# Patient Record
Sex: MALE
[De-identification: names, ages, dates, MRNs, and addresses within clinical notes are randomized per-mention and may not be internally consistent; named-entity substitution may affect disease eponyms.]

---

## 2020-06-11 ENCOUNTER — HOSPITAL ENCOUNTER (EMERGENCY)
Dept: HOSPITAL 84 - D.ER | Age: 50
Discharge: HOME | End: 2020-06-11
Payer: SELF-PAY

## 2020-06-11 VITALS — HEIGHT: 71 IN | WEIGHT: 240.5 LBS | BODY MASS INDEX: 33.67 KG/M2

## 2020-06-11 VITALS — DIASTOLIC BLOOD PRESSURE: 84 MMHG | SYSTOLIC BLOOD PRESSURE: 152 MMHG

## 2020-06-11 DIAGNOSIS — I10: ICD-10-CM
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
## 2020-06-11 DIAGNOSIS — Z72.0: ICD-10-CM

## 2020-06-11 DIAGNOSIS — S32.039A: Primary | ICD-10-CM

## 2020-06-11 DIAGNOSIS — M51.36: ICD-10-CM

## 2020-06-11 DIAGNOSIS — M54.5: ICD-10-CM

## 2020-08-13 ENCOUNTER — HOSPITAL ENCOUNTER (EMERGENCY)
Dept: HOSPITAL 84 - D.ER | Age: 50
Discharge: HOME | End: 2020-08-13
Payer: COMMERCIAL

## 2020-08-13 VITALS — HEIGHT: 71 IN | WEIGHT: 230.48 LBS | BODY MASS INDEX: 32.27 KG/M2

## 2020-08-13 VITALS — SYSTOLIC BLOOD PRESSURE: 156 MMHG | DIASTOLIC BLOOD PRESSURE: 94 MMHG

## 2020-08-13 DIAGNOSIS — I10: ICD-10-CM

## 2020-08-13 DIAGNOSIS — G89.18: ICD-10-CM

## 2020-08-13 DIAGNOSIS — M25.561: ICD-10-CM

## 2020-08-13 DIAGNOSIS — L76.22: Primary | ICD-10-CM

## 2020-08-13 DIAGNOSIS — Z96.651: ICD-10-CM

## 2020-08-13 LAB
ALBUMIN SERPL-MCNC: 4.2 G/DL (ref 3.4–5)
ALP SERPL-CCNC: 89 U/L (ref 30–120)
ALT SERPL-CCNC: 35 U/L (ref 10–68)
ANION GAP SERPL CALC-SCNC: 14.7 MMOL/L (ref 8–16)
APTT BLD: 32 SECONDS (ref 22.8–39.4)
BASOPHILS NFR BLD AUTO: 0 % (ref 0–2)
BILIRUB SERPL-MCNC: 0.65 MG/DL (ref 0.2–1.3)
BUN SERPL-MCNC: 22 MG/DL (ref 7–18)
CALCIUM SERPL-MCNC: 8.6 MG/DL (ref 8.5–10.1)
CHLORIDE SERPL-SCNC: 104 MMOL/L (ref 98–107)
CO2 SERPL-SCNC: 25.1 MMOL/L (ref 21–32)
CREAT SERPL-MCNC: 1.3 MG/DL (ref 0.6–1.3)
EOSINOPHIL NFR BLD: 0 % (ref 0–7)
ERYTHROCYTE [DISTWIDTH] IN BLOOD BY AUTOMATED COUNT: 14.4 % (ref 11.5–14.5)
GLOBULIN SER-MCNC: 3.5 G/L
GLUCOSE SERPL-MCNC: 140 MG/DL (ref 74–106)
HCT VFR BLD CALC: 34.2 % (ref 42–54)
HGB BLD-MCNC: 11.4 G/DL (ref 13.5–17.5)
IMM GRANULOCYTES NFR BLD: 0.4 % (ref 0–5)
INR PPP: 1.19 (ref 0.85–1.17)
LYMPHOCYTES NFR BLD AUTO: 13.6 % (ref 15–50)
MCH RBC QN AUTO: 30.6 PG (ref 26–34)
MCHC RBC AUTO-ENTMCNC: 33.3 G/DL (ref 31–37)
MCV RBC: 91.7 FL (ref 80–100)
MONOCYTES NFR BLD: 8.3 % (ref 2–11)
NEUTROPHILS NFR BLD AUTO: 77.7 % (ref 40–80)
OSMOLALITY SERPL CALC.SUM OF ELEC: 283 MOSM/KG (ref 275–300)
PLATELET # BLD: 194 10X3/UL (ref 130–400)
PMV BLD AUTO: 8.3 FL (ref 7.4–10.4)
POTASSIUM SERPL-SCNC: 3.8 MMOL/L (ref 3.5–5.1)
PROT SERPL-MCNC: 7.7 G/DL (ref 6.4–8.2)
PROTHROMBIN TIME: 15 SECONDS (ref 11.6–15)
RBC # BLD AUTO: 3.73 10X6/UL (ref 4.2–6.1)
SODIUM SERPL-SCNC: 140 MMOL/L (ref 136–145)
WBC # BLD AUTO: 16.2 10X3/UL (ref 4.8–10.8)

## 2021-03-08 ENCOUNTER — HOSPITAL ENCOUNTER (INPATIENT)
Dept: HOSPITAL 84 - D.ER | Age: 51
LOS: 11 days | Discharge: HOME | DRG: 193 | End: 2021-03-19
Attending: FAMILY MEDICINE | Admitting: FAMILY MEDICINE
Payer: MEDICARE

## 2021-03-08 VITALS
BODY MASS INDEX: 32.97 KG/M2 | HEIGHT: 71 IN | WEIGHT: 235.49 LBS | HEIGHT: 71 IN | BODY MASS INDEX: 32.97 KG/M2 | BODY MASS INDEX: 32.97 KG/M2 | WEIGHT: 235.49 LBS

## 2021-03-08 VITALS — SYSTOLIC BLOOD PRESSURE: 151 MMHG | DIASTOLIC BLOOD PRESSURE: 107 MMHG

## 2021-03-08 VITALS — DIASTOLIC BLOOD PRESSURE: 98 MMHG | SYSTOLIC BLOOD PRESSURE: 145 MMHG

## 2021-03-08 VITALS — SYSTOLIC BLOOD PRESSURE: 149 MMHG | DIASTOLIC BLOOD PRESSURE: 98 MMHG

## 2021-03-08 DIAGNOSIS — G89.29: ICD-10-CM

## 2021-03-08 DIAGNOSIS — M51.36: ICD-10-CM

## 2021-03-08 DIAGNOSIS — F10.10: ICD-10-CM

## 2021-03-08 DIAGNOSIS — J18.9: Primary | ICD-10-CM

## 2021-03-08 DIAGNOSIS — J96.00: ICD-10-CM

## 2021-03-08 DIAGNOSIS — R04.2: ICD-10-CM

## 2021-03-08 DIAGNOSIS — E66.9: ICD-10-CM

## 2021-03-08 DIAGNOSIS — I10: ICD-10-CM

## 2021-03-08 DIAGNOSIS — J69.0: ICD-10-CM

## 2021-03-08 DIAGNOSIS — D64.9: ICD-10-CM

## 2021-03-08 DIAGNOSIS — F17.203: ICD-10-CM

## 2021-03-08 LAB
ALBUMIN SERPL-MCNC: 4.4 G/DL (ref 3.4–5)
ALP SERPL-CCNC: 143 U/L (ref 30–120)
ALT SERPL-CCNC: 30 U/L (ref 10–68)
AMPHETAMINES UR QL SCN: NEGATIVE QUAL
AMYLASE SERPL-CCNC: 19 U/L (ref 25–115)
ANION GAP SERPL CALC-SCNC: 14.3 MMOL/L (ref 8–16)
APTT BLD: 33.4 SECONDS (ref 22.8–39.4)
BARBITURATES UR QL SCN: NEGATIVE QUAL
BASOPHILS NFR BLD AUTO: 0.1 % (ref 0–2)
BENZODIAZ UR QL SCN: NEGATIVE QUAL
BILIRUB SERPL-MCNC: 0.61 MG/DL (ref 0.2–1.3)
BILIRUB SERPL-MCNC: NEGATIVE MG/DL
BUN SERPL-MCNC: 17 MG/DL (ref 7–18)
BZE UR QL SCN: NEGATIVE QUAL
CALCIUM SERPL-MCNC: 9.4 MG/DL (ref 8.5–10.1)
CANNABINOIDS UR QL SCN: NEGATIVE QUAL
CHLORIDE SERPL-SCNC: 101 MMOL/L (ref 98–107)
CK MB SERPL-MCNC: 3.3 U/L (ref 0–3.6)
CK SERPL-CCNC: 204 UL (ref 21–232)
CO2 SERPL-SCNC: 24.4 MMOL/L (ref 21–32)
CREAT SERPL-MCNC: 1 MG/DL (ref 0.6–1.3)
D DIMER PPP FEU-MCNC: 1.22 UG/MLFEU (ref 0.2–0.54)
EOSINOPHIL NFR BLD: 0.7 % (ref 0–7)
ERYTHROCYTE [DISTWIDTH] IN BLOOD BY AUTOMATED COUNT: 13.4 % (ref 11.5–14.5)
GLOBULIN SER-MCNC: 3.8 G/L
GLUCOSE SERPL-MCNC: 95 MG/DL (ref 74–106)
HCT VFR BLD CALC: 41.3 % (ref 42–54)
HGB BLD-MCNC: 14.2 G/DL (ref 13.5–17.5)
IMM GRANULOCYTES NFR BLD: 0.4 % (ref 0–5)
INR PPP: 1.15 (ref 0.85–1.17)
KETONES UR STRIP-MCNC: NEGATIVE MG/DL
LIPASE SERPL-CCNC: 48 U/L (ref 73–393)
LYMPHOCYTES # BLD: 1.95 10X3/UL (ref 1.32–3.57)
LYMPHOCYTES NFR BLD AUTO: 17.3 % (ref 15–50)
MCH RBC QN AUTO: 30.7 PG (ref 26–34)
MCHC RBC AUTO-ENTMCNC: 34.4 G/DL (ref 31–37)
MCV RBC: 89.4 FL (ref 80–100)
MONOCYTES NFR BLD: 5.7 % (ref 2–11)
NEUTROPHILS # BLD: 8.57 10X3/UL (ref 1.78–5.38)
NEUTROPHILS NFR BLD AUTO: 75.8 % (ref 40–80)
NITRITE UR-MCNC: NEGATIVE MG/ML
OPIATES UR QL SCN: NEGATIVE QUAL
OSMOLALITY SERPL CALC.SUM OF ELEC: 273 MOSM/KG (ref 275–300)
PCP UR QL SCN: NEGATIVE QUAL
PH UR STRIP: 5 [PH] (ref 5–8)
PLATELET # BLD: 148 10X3/UL (ref 130–400)
PMV BLD AUTO: 8 FL (ref 7.4–10.4)
POTASSIUM SERPL-SCNC: 3.7 MMOL/L (ref 3.5–5.1)
PROT SERPL-MCNC: 8.2 G/DL (ref 6.4–8.2)
PROTHROMBIN TIME: 13.6 SECONDS (ref 11.6–15)
RBC # BLD AUTO: 4.62 10X6/UL (ref 4.2–6.1)
SODIUM SERPL-SCNC: 136 MMOL/L (ref 136–145)
TROPONIN I SERPL-MCNC: < 0.017 NG/ML (ref 0–0.06)
UROBILINOGEN UR-MCNC: NORMAL MG/DL (ref ?–2)
WBC # BLD AUTO: 11.3 10X3/UL (ref 4.8–10.8)

## 2021-03-09 VITALS — DIASTOLIC BLOOD PRESSURE: 76 MMHG | SYSTOLIC BLOOD PRESSURE: 134 MMHG

## 2021-03-09 VITALS — SYSTOLIC BLOOD PRESSURE: 164 MMHG | DIASTOLIC BLOOD PRESSURE: 106 MMHG

## 2021-03-09 VITALS — SYSTOLIC BLOOD PRESSURE: 137 MMHG | DIASTOLIC BLOOD PRESSURE: 79 MMHG

## 2021-03-09 VITALS — DIASTOLIC BLOOD PRESSURE: 97 MMHG | SYSTOLIC BLOOD PRESSURE: 154 MMHG

## 2021-03-09 VITALS — DIASTOLIC BLOOD PRESSURE: 97 MMHG | SYSTOLIC BLOOD PRESSURE: 150 MMHG

## 2021-03-09 VITALS — DIASTOLIC BLOOD PRESSURE: 93 MMHG | SYSTOLIC BLOOD PRESSURE: 154 MMHG

## 2021-03-09 VITALS — SYSTOLIC BLOOD PRESSURE: 135 MMHG | DIASTOLIC BLOOD PRESSURE: 83 MMHG

## 2021-03-09 VITALS — SYSTOLIC BLOOD PRESSURE: 179 MMHG | DIASTOLIC BLOOD PRESSURE: 130 MMHG

## 2021-03-09 VITALS — SYSTOLIC BLOOD PRESSURE: 150 MMHG | DIASTOLIC BLOOD PRESSURE: 95 MMHG

## 2021-03-09 VITALS — SYSTOLIC BLOOD PRESSURE: 134 MMHG | DIASTOLIC BLOOD PRESSURE: 76 MMHG

## 2021-03-09 VITALS — SYSTOLIC BLOOD PRESSURE: 160 MMHG | DIASTOLIC BLOOD PRESSURE: 81 MMHG

## 2021-03-09 VITALS — SYSTOLIC BLOOD PRESSURE: 139 MMHG | DIASTOLIC BLOOD PRESSURE: 90 MMHG

## 2021-03-09 VITALS — DIASTOLIC BLOOD PRESSURE: 92 MMHG | SYSTOLIC BLOOD PRESSURE: 151 MMHG

## 2021-03-09 VITALS — DIASTOLIC BLOOD PRESSURE: 113 MMHG | SYSTOLIC BLOOD PRESSURE: 158 MMHG

## 2021-03-09 VITALS — SYSTOLIC BLOOD PRESSURE: 152 MMHG | DIASTOLIC BLOOD PRESSURE: 95 MMHG

## 2021-03-09 VITALS — DIASTOLIC BLOOD PRESSURE: 82 MMHG | SYSTOLIC BLOOD PRESSURE: 132 MMHG

## 2021-03-09 VITALS — DIASTOLIC BLOOD PRESSURE: 97 MMHG | SYSTOLIC BLOOD PRESSURE: 142 MMHG

## 2021-03-09 VITALS — SYSTOLIC BLOOD PRESSURE: 131 MMHG | DIASTOLIC BLOOD PRESSURE: 106 MMHG

## 2021-03-09 VITALS — SYSTOLIC BLOOD PRESSURE: 154 MMHG | DIASTOLIC BLOOD PRESSURE: 100 MMHG

## 2021-03-09 VITALS — DIASTOLIC BLOOD PRESSURE: 99 MMHG | SYSTOLIC BLOOD PRESSURE: 152 MMHG

## 2021-03-09 VITALS — SYSTOLIC BLOOD PRESSURE: 146 MMHG | DIASTOLIC BLOOD PRESSURE: 92 MMHG

## 2021-03-09 VITALS — DIASTOLIC BLOOD PRESSURE: 74 MMHG | SYSTOLIC BLOOD PRESSURE: 133 MMHG

## 2021-03-09 VITALS — SYSTOLIC BLOOD PRESSURE: 154 MMHG | DIASTOLIC BLOOD PRESSURE: 97 MMHG

## 2021-03-09 VITALS — SYSTOLIC BLOOD PRESSURE: 150 MMHG | DIASTOLIC BLOOD PRESSURE: 97 MMHG

## 2021-03-09 VITALS — DIASTOLIC BLOOD PRESSURE: 91 MMHG | SYSTOLIC BLOOD PRESSURE: 151 MMHG

## 2021-03-09 VITALS — DIASTOLIC BLOOD PRESSURE: 100 MMHG | SYSTOLIC BLOOD PRESSURE: 159 MMHG

## 2021-03-09 VITALS — DIASTOLIC BLOOD PRESSURE: 66 MMHG | SYSTOLIC BLOOD PRESSURE: 108 MMHG

## 2021-03-09 VITALS — SYSTOLIC BLOOD PRESSURE: 144 MMHG | DIASTOLIC BLOOD PRESSURE: 91 MMHG

## 2021-03-09 VITALS — SYSTOLIC BLOOD PRESSURE: 108 MMHG | DIASTOLIC BLOOD PRESSURE: 68 MMHG

## 2021-03-09 VITALS — SYSTOLIC BLOOD PRESSURE: 159 MMHG | DIASTOLIC BLOOD PRESSURE: 119 MMHG

## 2021-03-09 VITALS — DIASTOLIC BLOOD PRESSURE: 122 MMHG | SYSTOLIC BLOOD PRESSURE: 145 MMHG

## 2021-03-09 VITALS — DIASTOLIC BLOOD PRESSURE: 82 MMHG | SYSTOLIC BLOOD PRESSURE: 127 MMHG

## 2021-03-09 VITALS — SYSTOLIC BLOOD PRESSURE: 148 MMHG | DIASTOLIC BLOOD PRESSURE: 98 MMHG

## 2021-03-09 VITALS — SYSTOLIC BLOOD PRESSURE: 152 MMHG | DIASTOLIC BLOOD PRESSURE: 61 MMHG

## 2021-03-09 VITALS — DIASTOLIC BLOOD PRESSURE: 99 MMHG | SYSTOLIC BLOOD PRESSURE: 155 MMHG

## 2021-03-09 VITALS — DIASTOLIC BLOOD PRESSURE: 96 MMHG | SYSTOLIC BLOOD PRESSURE: 120 MMHG

## 2021-03-09 VITALS — SYSTOLIC BLOOD PRESSURE: 154 MMHG | DIASTOLIC BLOOD PRESSURE: 102 MMHG

## 2021-03-09 LAB
ALBUMIN SERPL-MCNC: 3.6 G/DL (ref 3.4–5)
ALP SERPL-CCNC: 119 U/L (ref 30–120)
ALT SERPL-CCNC: 25 U/L (ref 10–68)
ANION GAP SERPL CALC-SCNC: 11.5 MMOL/L (ref 8–16)
BASOPHILS NFR BLD AUTO: 0.2 % (ref 0–2)
BILIRUB SERPL-MCNC: 0.62 MG/DL (ref 0.2–1.3)
BUN SERPL-MCNC: 15 MG/DL (ref 7–18)
CALCIUM SERPL-MCNC: 8.1 MG/DL (ref 8.5–10.1)
CHLORIDE SERPL-SCNC: 104 MMOL/L (ref 98–107)
CO2 SERPL-SCNC: 25.2 MMOL/L (ref 21–32)
CREAT SERPL-MCNC: 1 MG/DL (ref 0.6–1.3)
EOSINOPHIL NFR BLD: 1.7 % (ref 0–7)
ERYTHROCYTE [DISTWIDTH] IN BLOOD BY AUTOMATED COUNT: 13.5 % (ref 11.5–14.5)
GLOBULIN SER-MCNC: 3.1 G/L
GLUCOSE SERPL-MCNC: 103 MG/DL (ref 74–106)
HCT VFR BLD CALC: 37.3 % (ref 42–54)
HCT VFR BLD CALC: 38.6 % (ref 42–54)
HCT VFR BLD CALC: 39.6 % (ref 42–54)
HCT VFR BLD CALC: 41.7 % (ref 42–54)
HGB BLD-MCNC: 12.6 G/DL (ref 13.5–17.5)
HGB BLD-MCNC: 13.3 G/DL (ref 13.5–17.5)
HGB BLD-MCNC: 13.9 G/DL (ref 13.5–17.5)
HGB BLD-MCNC: 14.6 G/DL (ref 13.5–17.5)
IMM GRANULOCYTES NFR BLD: 0.3 % (ref 0–5)
LYMPHOCYTES # BLD: 1.96 10X3/UL (ref 1.32–3.57)
LYMPHOCYTES NFR BLD AUTO: 22.6 % (ref 15–50)
MAGNESIUM SERPL-MCNC: 3.2 MG/DL (ref 1.8–2.4)
MCH RBC QN AUTO: 30.6 PG (ref 26–34)
MCHC RBC AUTO-ENTMCNC: 33.8 G/DL (ref 31–37)
MCV RBC: 90.5 FL (ref 80–100)
MONOCYTES NFR BLD: 6.6 % (ref 2–11)
NEUTROPHILS # BLD: 5.95 10X3/UL (ref 1.78–5.38)
NEUTROPHILS NFR BLD AUTO: 68.6 % (ref 40–80)
OSMOLALITY SERPL CALC.SUM OF ELEC: 274 MOSM/KG (ref 275–300)
PLATELET # BLD: 175 10X3/UL (ref 130–400)
PMV BLD AUTO: 8.3 FL (ref 7.4–10.4)
POTASSIUM SERPL-SCNC: 3.7 MMOL/L (ref 3.5–5.1)
PROT SERPL-MCNC: 6.7 G/DL (ref 6.4–8.2)
RBC # BLD AUTO: 4.12 10X6/UL (ref 4.2–6.1)
SODIUM SERPL-SCNC: 137 MMOL/L (ref 136–145)
WBC # BLD AUTO: 8.7 10X3/UL (ref 4.8–10.8)

## 2021-03-09 PROCEDURE — 0B9J8ZX DRAINAGE OF LEFT LOWER LUNG LOBE, VIA NATURAL OR ARTIFICIAL OPENING ENDOSCOPIC, DIAGNOSTIC: ICD-10-PCS | Performed by: INTERNAL MEDICINE

## 2021-03-09 NOTE — NUR
REPORTS HE TAKES METOPROLOL 25 MG AND ALSO THAT HE USUALLY TAKES HIS BP MEDS
AT NIGHT. CALLED DR. KAUFFMAN FOR METOPROLOL ORDER AND CALLED [facility] TO CHANGE
TO NIGHTS.

## 2021-03-09 NOTE — NUR
DR. KAUFFMAN CALLED. PT REQUESTING HIS MEDICINES AND MED REC NOT DONE. DR. KAUFFMAN REPORTS HE WILL BE AROUND SOON AND WILL WORK ON IT.

## 2021-03-09 NOTE — NUR
CONSULTED DR. GR. REPORTED S/P BRONCH WITH COLLECTION OF BLOOD. ORDERS
RECEIVED FOR DIET, AS HE DOES NOT PLAN ON DOING A SCOPE.

## 2021-03-09 NOTE — NUR
BRONCH COMPLETE. BLOOD COLLECTED FROM LLL PER DR. AMBRIZ. 4 MG VERSED AND 50
MCG OF FENTANYL GIVEN. DID NOT REMAIN COMFORTABLE DURING PROCEDURE. PAGED DR. KAUFFMAN ON PT'S BEHALF AND ASKED IF PATCH COULD BE REPLACED FOR PERCOCET AND
HE OKAYS THIS.

## 2021-03-10 VITALS — DIASTOLIC BLOOD PRESSURE: 89 MMHG | SYSTOLIC BLOOD PRESSURE: 107 MMHG

## 2021-03-10 VITALS — DIASTOLIC BLOOD PRESSURE: 87 MMHG | SYSTOLIC BLOOD PRESSURE: 161 MMHG

## 2021-03-10 VITALS — DIASTOLIC BLOOD PRESSURE: 94 MMHG | SYSTOLIC BLOOD PRESSURE: 160 MMHG

## 2021-03-10 VITALS — DIASTOLIC BLOOD PRESSURE: 96 MMHG | SYSTOLIC BLOOD PRESSURE: 155 MMHG

## 2021-03-10 VITALS — SYSTOLIC BLOOD PRESSURE: 141 MMHG | DIASTOLIC BLOOD PRESSURE: 83 MMHG

## 2021-03-10 VITALS — SYSTOLIC BLOOD PRESSURE: 164 MMHG | DIASTOLIC BLOOD PRESSURE: 102 MMHG

## 2021-03-10 VITALS — DIASTOLIC BLOOD PRESSURE: 86 MMHG | SYSTOLIC BLOOD PRESSURE: 127 MMHG

## 2021-03-10 VITALS — DIASTOLIC BLOOD PRESSURE: 96 MMHG | SYSTOLIC BLOOD PRESSURE: 121 MMHG

## 2021-03-10 VITALS — SYSTOLIC BLOOD PRESSURE: 140 MMHG | DIASTOLIC BLOOD PRESSURE: 82 MMHG

## 2021-03-10 VITALS — DIASTOLIC BLOOD PRESSURE: 81 MMHG | SYSTOLIC BLOOD PRESSURE: 136 MMHG

## 2021-03-10 VITALS — DIASTOLIC BLOOD PRESSURE: 89 MMHG | SYSTOLIC BLOOD PRESSURE: 126 MMHG

## 2021-03-10 VITALS — DIASTOLIC BLOOD PRESSURE: 90 MMHG | SYSTOLIC BLOOD PRESSURE: 141 MMHG

## 2021-03-10 VITALS — SYSTOLIC BLOOD PRESSURE: 121 MMHG | DIASTOLIC BLOOD PRESSURE: 75 MMHG

## 2021-03-10 VITALS — DIASTOLIC BLOOD PRESSURE: 92 MMHG | SYSTOLIC BLOOD PRESSURE: 141 MMHG

## 2021-03-10 VITALS — DIASTOLIC BLOOD PRESSURE: 94 MMHG | SYSTOLIC BLOOD PRESSURE: 151 MMHG

## 2021-03-10 VITALS — DIASTOLIC BLOOD PRESSURE: 91 MMHG | SYSTOLIC BLOOD PRESSURE: 142 MMHG

## 2021-03-10 VITALS — DIASTOLIC BLOOD PRESSURE: 93 MMHG | SYSTOLIC BLOOD PRESSURE: 152 MMHG

## 2021-03-10 LAB
ALBUMIN SERPL-MCNC: 3.7 G/DL (ref 3.4–5)
ALP SERPL-CCNC: 131 U/L (ref 30–120)
ALT SERPL-CCNC: 26 U/L (ref 10–68)
ANION GAP SERPL CALC-SCNC: 14.4 MMOL/L (ref 8–16)
BASOPHILS NFR BLD AUTO: 0 % (ref 0–2)
BILIRUB SERPL-MCNC: 0.36 MG/DL (ref 0.2–1.3)
BUN SERPL-MCNC: 18 MG/DL (ref 7–18)
CALCIUM SERPL-MCNC: 8.7 MG/DL (ref 8.5–10.1)
CHLORIDE SERPL-SCNC: 102 MMOL/L (ref 98–107)
CO2 SERPL-SCNC: 21.2 MMOL/L (ref 21–32)
CREAT SERPL-MCNC: 1.1 MG/DL (ref 0.6–1.3)
EOSINOPHIL NFR BLD: 0.1 % (ref 0–7)
ERYTHROCYTE [DISTWIDTH] IN BLOOD BY AUTOMATED COUNT: 13.5 % (ref 11.5–14.5)
GLOBULIN SER-MCNC: 3.6 G/L
GLUCOSE SERPL-MCNC: 217 MG/DL (ref 74–106)
HCT VFR BLD CALC: 38 % (ref 42–54)
HCT VFR BLD CALC: 39.2 % (ref 42–54)
HCT VFR BLD CALC: 39.3 % (ref 42–54)
HCT VFR BLD CALC: 40 % (ref 42–54)
HGB BLD-MCNC: 13.1 G/DL (ref 13.5–17.5)
HGB BLD-MCNC: 13.2 G/DL (ref 13.5–17.5)
HGB BLD-MCNC: 13.5 G/DL (ref 13.5–17.5)
HGB BLD-MCNC: 13.8 G/DL (ref 13.5–17.5)
IMM GRANULOCYTES NFR BLD: 0.4 % (ref 0–5)
LYMPHOCYTES # BLD: 0.85 10X3/UL (ref 1.32–3.57)
LYMPHOCYTES NFR BLD AUTO: 7.3 % (ref 15–50)
MAGNESIUM SERPL-MCNC: 2.6 MG/DL (ref 1.8–2.4)
MCH RBC QN AUTO: 30.8 PG (ref 26–34)
MCHC RBC AUTO-ENTMCNC: 33.7 G/DL (ref 31–37)
MCV RBC: 91.4 FL (ref 80–100)
MONOCYTES NFR BLD: 0.8 % (ref 2–11)
NEUTROPHILS # BLD: 10.57 10X3/UL (ref 1.78–5.38)
NEUTROPHILS NFR BLD AUTO: 91.4 % (ref 40–80)
OSMOLALITY SERPL CALC.SUM OF ELEC: 274 MOSM/KG (ref 275–300)
PLATELET # BLD: 203 10X3/UL (ref 130–400)
PMV BLD AUTO: 8.7 FL (ref 7.4–10.4)
POTASSIUM SERPL-SCNC: 4.6 MMOL/L (ref 3.5–5.1)
PROT SERPL-MCNC: 7.3 G/DL (ref 6.4–8.2)
RBC # BLD AUTO: 4.29 10X6/UL (ref 4.2–6.1)
SODIUM SERPL-SCNC: 133 MMOL/L (ref 136–145)
SPECIMEN PREPARATION: (no result)
WBC # BLD AUTO: 11.6 10X3/UL (ref 4.8–10.8)

## 2021-03-11 VITALS — SYSTOLIC BLOOD PRESSURE: 142 MMHG | DIASTOLIC BLOOD PRESSURE: 95 MMHG

## 2021-03-11 VITALS — SYSTOLIC BLOOD PRESSURE: 128 MMHG | DIASTOLIC BLOOD PRESSURE: 83 MMHG

## 2021-03-11 VITALS — DIASTOLIC BLOOD PRESSURE: 64 MMHG | SYSTOLIC BLOOD PRESSURE: 137 MMHG

## 2021-03-11 VITALS — SYSTOLIC BLOOD PRESSURE: 133 MMHG | DIASTOLIC BLOOD PRESSURE: 65 MMHG

## 2021-03-11 VITALS — DIASTOLIC BLOOD PRESSURE: 65 MMHG | SYSTOLIC BLOOD PRESSURE: 107 MMHG

## 2021-03-11 VITALS — DIASTOLIC BLOOD PRESSURE: 64 MMHG | SYSTOLIC BLOOD PRESSURE: 116 MMHG

## 2021-03-11 VITALS — SYSTOLIC BLOOD PRESSURE: 137 MMHG | DIASTOLIC BLOOD PRESSURE: 92 MMHG

## 2021-03-11 VITALS — SYSTOLIC BLOOD PRESSURE: 172 MMHG | DIASTOLIC BLOOD PRESSURE: 97 MMHG

## 2021-03-11 VITALS — SYSTOLIC BLOOD PRESSURE: 137 MMHG | DIASTOLIC BLOOD PRESSURE: 86 MMHG

## 2021-03-11 VITALS — DIASTOLIC BLOOD PRESSURE: 55 MMHG | SYSTOLIC BLOOD PRESSURE: 98 MMHG

## 2021-03-11 VITALS — DIASTOLIC BLOOD PRESSURE: 86 MMHG | SYSTOLIC BLOOD PRESSURE: 137 MMHG

## 2021-03-11 VITALS — SYSTOLIC BLOOD PRESSURE: 166 MMHG | DIASTOLIC BLOOD PRESSURE: 89 MMHG

## 2021-03-11 VITALS — SYSTOLIC BLOOD PRESSURE: 133 MMHG | DIASTOLIC BLOOD PRESSURE: 77 MMHG

## 2021-03-11 LAB
ALBUMIN SERPL-MCNC: 3.8 G/DL (ref 3.4–5)
ALP SERPL-CCNC: 134 U/L (ref 30–120)
ALT SERPL-CCNC: 31 U/L (ref 10–68)
ANION GAP SERPL CALC-SCNC: 12.1 MMOL/L (ref 8–16)
BILIRUB SERPL-MCNC: 0.38 MG/DL (ref 0.2–1.3)
BUN SERPL-MCNC: 16 MG/DL (ref 7–18)
CALCIUM SERPL-MCNC: 8.8 MG/DL (ref 8.5–10.1)
CHLORIDE SERPL-SCNC: 105 MMOL/L (ref 98–107)
CO2 SERPL-SCNC: 24.1 MMOL/L (ref 21–32)
CREAT SERPL-MCNC: 1.2 MG/DL (ref 0.6–1.3)
ERYTHROCYTE [DISTWIDTH] IN BLOOD BY AUTOMATED COUNT: 13.4 % (ref 11.5–14.5)
GLOBULIN SER-MCNC: 3.6 G/L
GLUCOSE SERPL-MCNC: 211 MG/DL (ref 74–106)
HCT VFR BLD CALC: 37.9 % (ref 42–54)
HCT VFR BLD CALC: 39.2 % (ref 42–54)
HCT VFR BLD CALC: 39.2 % (ref 42–54)
HCT VFR BLD CALC: 39.9 % (ref 42–54)
HGB BLD-MCNC: 12.9 G/DL (ref 13.5–17.5)
HGB BLD-MCNC: 13.2 G/DL (ref 13.5–17.5)
HGB BLD-MCNC: 13.3 G/DL (ref 13.5–17.5)
HGB BLD-MCNC: 13.6 G/DL (ref 13.5–17.5)
LYMPHOCYTES # BLD: 1.14 10X3/UL (ref 1.32–3.57)
LYMPHOCYTES NFR BLD AUTO: 6 % (ref 15–50)
MAGNESIUM SERPL-MCNC: 2.4 MG/DL (ref 1.8–2.4)
MCH RBC QN AUTO: 30.9 PG (ref 26–34)
MCHC RBC AUTO-ENTMCNC: 34.1 G/DL (ref 31–37)
MCV RBC: 90.7 FL (ref 80–100)
NEUTROPHILS # BLD: 19.45 10X3/UL (ref 1.78–5.38)
NEUTROPHILS NFR BLD AUTO: 93 % (ref 40–80)
OSMOLALITY SERPL CALC.SUM OF ELEC: 280 MOSM/KG (ref 275–300)
PLATELET # BLD EST: (no result) 10*3/UL
PLATELET # BLD: 195 10X3/UL (ref 130–400)
PMV BLD AUTO: 8.5 FL (ref 7.4–10.4)
POTASSIUM SERPL-SCNC: 4.2 MMOL/L (ref 3.5–5.1)
PROT SERPL-MCNC: 7.4 G/DL (ref 6.4–8.2)
RBC # BLD AUTO: 4.4 10X6/UL (ref 4.2–6.1)
SODIUM SERPL-SCNC: 137 MMOL/L (ref 136–145)
WBC # BLD AUTO: 21.3 10X3/UL (ref 4.8–10.8)

## 2021-03-12 VITALS — SYSTOLIC BLOOD PRESSURE: 170 MMHG | DIASTOLIC BLOOD PRESSURE: 90 MMHG

## 2021-03-12 VITALS — DIASTOLIC BLOOD PRESSURE: 98 MMHG | SYSTOLIC BLOOD PRESSURE: 157 MMHG

## 2021-03-12 VITALS — DIASTOLIC BLOOD PRESSURE: 85 MMHG | SYSTOLIC BLOOD PRESSURE: 157 MMHG

## 2021-03-12 VITALS — SYSTOLIC BLOOD PRESSURE: 145 MMHG | DIASTOLIC BLOOD PRESSURE: 87 MMHG

## 2021-03-12 VITALS — SYSTOLIC BLOOD PRESSURE: 157 MMHG | DIASTOLIC BLOOD PRESSURE: 92 MMHG

## 2021-03-12 VITALS — DIASTOLIC BLOOD PRESSURE: 57 MMHG | SYSTOLIC BLOOD PRESSURE: 145 MMHG

## 2021-03-12 LAB
ALBUMIN SERPL-MCNC: 3.5 G/DL (ref 3.4–5)
ALP SERPL-CCNC: 118 U/L (ref 30–120)
ALT SERPL-CCNC: 35 U/L (ref 10–68)
ANION GAP SERPL CALC-SCNC: 15.7 MMOL/L (ref 8–16)
BASOPHILS NFR BLD AUTO: 0 % (ref 0–2)
BILIRUB SERPL-MCNC: 0.31 MG/DL (ref 0.2–1.3)
BILIRUB SERPL-MCNC: NEGATIVE MG/DL
BUN SERPL-MCNC: 21 MG/DL (ref 7–18)
CALCIUM SERPL-MCNC: 8.6 MG/DL (ref 8.5–10.1)
CHLORIDE SERPL-SCNC: 102 MMOL/L (ref 98–107)
CO2 SERPL-SCNC: 23.2 MMOL/L (ref 21–32)
CREAT SERPL-MCNC: 1.1 MG/DL (ref 0.6–1.3)
EOSINOPHIL NFR BLD: 0 % (ref 0–7)
ERYTHROCYTE [DISTWIDTH] IN BLOOD BY AUTOMATED COUNT: 13.5 % (ref 11.5–14.5)
GLOBULIN SER-MCNC: 3.5 G/L
GLUCOSE SERPL-MCNC: 191 MG/DL (ref 74–106)
HCT VFR BLD CALC: 37.8 % (ref 42–54)
HGB BLD-MCNC: 12.7 G/DL (ref 13.5–17.5)
IMM GRANULOCYTES NFR BLD: 0.8 % (ref 0–5)
KETONES UR STRIP-MCNC: NEGATIVE MG/DL
LYMPHOCYTES # BLD: 1.36 10X3/UL (ref 1.32–3.57)
LYMPHOCYTES NFR BLD AUTO: 6.7 % (ref 15–50)
MAGNESIUM SERPL-MCNC: 2.3 MG/DL (ref 1.8–2.4)
MCH RBC QN AUTO: 30.7 PG (ref 26–34)
MCHC RBC AUTO-ENTMCNC: 33.6 G/DL (ref 31–37)
MCV RBC: 91.3 FL (ref 80–100)
MONOCYTES NFR BLD: 3.6 % (ref 2–11)
NEUTROPHILS # BLD: 18.06 10X3/UL (ref 1.78–5.38)
NEUTROPHILS NFR BLD AUTO: 88.9 % (ref 40–80)
NITRITE UR-MCNC: NEGATIVE MG/ML
OSMOLALITY SERPL CALC.SUM OF ELEC: 281 MOSM/KG (ref 275–300)
PH UR STRIP: 5 [PH] (ref 5–8)
PLATELET # BLD: 200 10X3/UL (ref 130–400)
PMV BLD AUTO: 8.7 FL (ref 7.4–10.4)
POTASSIUM SERPL-SCNC: 3.9 MMOL/L (ref 3.5–5.1)
PROT SERPL-MCNC: 7 G/DL (ref 6.4–8.2)
RBC # BLD AUTO: 4.14 10X6/UL (ref 4.2–6.1)
SODIUM SERPL-SCNC: 137 MMOL/L (ref 136–145)
UROBILINOGEN UR-MCNC: NORMAL MG/DL (ref ?–2)
WBC # BLD AUTO: 20.3 10X3/UL (ref 4.8–10.8)

## 2021-03-12 NOTE — NUR
PT WAS SITTING IN CHAIR, WIFE IN ROOM. RECEIVED REPORT ABOUT [employer]
PATCHES AND THAT THE PATIENT WOULD LIKE TO HAVE ONE APPLIED. CALLED AND SPOKE
TO MARIZA FLORES ABOUT PATCHES AND SITUATION. RECEIVED TELEPHONE ORDER FROM
MARIZA FLORES THAT IT WS OKAY TO USE PATIENT SUPPLY OF THE TRANSDERMAL PATCHES AT
THIS TIME.

## 2021-03-13 VITALS — DIASTOLIC BLOOD PRESSURE: 98 MMHG | SYSTOLIC BLOOD PRESSURE: 159 MMHG

## 2021-03-13 VITALS — DIASTOLIC BLOOD PRESSURE: 88 MMHG | SYSTOLIC BLOOD PRESSURE: 132 MMHG

## 2021-03-13 VITALS — DIASTOLIC BLOOD PRESSURE: 76 MMHG | SYSTOLIC BLOOD PRESSURE: 125 MMHG

## 2021-03-13 VITALS — DIASTOLIC BLOOD PRESSURE: 78 MMHG | SYSTOLIC BLOOD PRESSURE: 122 MMHG

## 2021-03-13 VITALS — DIASTOLIC BLOOD PRESSURE: 93 MMHG | SYSTOLIC BLOOD PRESSURE: 146 MMHG

## 2021-03-13 VITALS — DIASTOLIC BLOOD PRESSURE: 87 MMHG | SYSTOLIC BLOOD PRESSURE: 154 MMHG

## 2021-03-13 VITALS — DIASTOLIC BLOOD PRESSURE: 79 MMHG | SYSTOLIC BLOOD PRESSURE: 118 MMHG

## 2021-03-13 VITALS — SYSTOLIC BLOOD PRESSURE: 146 MMHG | DIASTOLIC BLOOD PRESSURE: 93 MMHG

## 2021-03-13 VITALS — DIASTOLIC BLOOD PRESSURE: 85 MMHG | SYSTOLIC BLOOD PRESSURE: 140 MMHG

## 2021-03-13 VITALS — SYSTOLIC BLOOD PRESSURE: 135 MMHG | DIASTOLIC BLOOD PRESSURE: 91 MMHG

## 2021-03-13 VITALS — SYSTOLIC BLOOD PRESSURE: 136 MMHG | DIASTOLIC BLOOD PRESSURE: 88 MMHG

## 2021-03-13 VITALS — SYSTOLIC BLOOD PRESSURE: 174 MMHG | DIASTOLIC BLOOD PRESSURE: 107 MMHG

## 2021-03-13 VITALS — SYSTOLIC BLOOD PRESSURE: 138 MMHG | DIASTOLIC BLOOD PRESSURE: 85 MMHG

## 2021-03-13 VITALS — DIASTOLIC BLOOD PRESSURE: 78 MMHG | SYSTOLIC BLOOD PRESSURE: 123 MMHG

## 2021-03-13 VITALS — SYSTOLIC BLOOD PRESSURE: 115 MMHG | DIASTOLIC BLOOD PRESSURE: 81 MMHG

## 2021-03-13 VITALS — SYSTOLIC BLOOD PRESSURE: 138 MMHG | DIASTOLIC BLOOD PRESSURE: 83 MMHG

## 2021-03-13 LAB
ALBUMIN SERPL-MCNC: 3.4 G/DL (ref 3.4–5)
ALP SERPL-CCNC: 136 U/L (ref 30–120)
ALT SERPL-CCNC: 57 U/L (ref 10–68)
ANION GAP SERPL CALC-SCNC: 12.6 MMOL/L (ref 8–16)
ANION GAP SERPL CALC-SCNC: 12.9 MMOL/L (ref 8–16)
BASOPHILS NFR BLD AUTO: 0.1 % (ref 0–2)
BASOPHILS NFR BLD AUTO: 0.1 % (ref 0–2)
BILIRUB SERPL-MCNC: 0.33 MG/DL (ref 0.2–1.3)
BUN SERPL-MCNC: 21 MG/DL (ref 7–18)
BUN SERPL-MCNC: 24 MG/DL (ref 7–18)
CALCIUM SERPL-MCNC: 8.3 MG/DL (ref 8.5–10.1)
CALCIUM SERPL-MCNC: 8.4 MG/DL (ref 8.5–10.1)
CHLORIDE SERPL-SCNC: 101 MMOL/L (ref 98–107)
CHLORIDE SERPL-SCNC: 102 MMOL/L (ref 98–107)
CO2 SERPL-SCNC: 26.3 MMOL/L (ref 21–32)
CO2 SERPL-SCNC: 26.8 MMOL/L (ref 21–32)
CREAT SERPL-MCNC: 1.2 MG/DL (ref 0.6–1.3)
CREAT SERPL-MCNC: 1.3 MG/DL (ref 0.6–1.3)
EOSINOPHIL NFR BLD: 0 % (ref 0–7)
EOSINOPHIL NFR BLD: 0.1 % (ref 0–7)
ERYTHROCYTE [DISTWIDTH] IN BLOOD BY AUTOMATED COUNT: 13.3 % (ref 11.5–14.5)
ERYTHROCYTE [DISTWIDTH] IN BLOOD BY AUTOMATED COUNT: 13.4 % (ref 11.5–14.5)
GLOBULIN SER-MCNC: 3.1 G/L
GLUCOSE SERPL-MCNC: 273 MG/DL (ref 74–106)
GLUCOSE SERPL-MCNC: 293 MG/DL (ref 74–106)
HCT VFR BLD CALC: 36.7 % (ref 42–54)
HCT VFR BLD CALC: 37.2 % (ref 42–54)
HGB BLD-MCNC: 12.6 G/DL (ref 13.5–17.5)
HGB BLD-MCNC: 12.7 G/DL (ref 13.5–17.5)
IMM GRANULOCYTES NFR BLD: 3.7 % (ref 0–5)
IMM GRANULOCYTES NFR BLD: 5.8 % (ref 0–5)
INR PPP: 1.24 (ref 0.85–1.17)
LYMPHOCYTES # BLD: 0.99 10X3/UL (ref 1.32–3.57)
LYMPHOCYTES # BLD: 1.25 10X3/UL (ref 1.32–3.57)
LYMPHOCYTES NFR BLD AUTO: 5.6 % (ref 15–50)
LYMPHOCYTES NFR BLD AUTO: 7.3 % (ref 15–50)
MAGNESIUM SERPL-MCNC: 2.2 MG/DL (ref 1.8–2.4)
MCH RBC QN AUTO: 30.7 PG (ref 26–34)
MCH RBC QN AUTO: 31.6 PG (ref 26–34)
MCHC RBC AUTO-ENTMCNC: 33.9 G/DL (ref 31–37)
MCHC RBC AUTO-ENTMCNC: 34.6 G/DL (ref 31–37)
MCV RBC: 90.7 FL (ref 80–100)
MCV RBC: 91.3 FL (ref 80–100)
MONOCYTES NFR BLD: 5.2 % (ref 2–11)
MONOCYTES NFR BLD: 5.4 % (ref 2–11)
NEUTROPHILS # BLD: 14.42 10X3/UL (ref 1.78–5.38)
NEUTROPHILS # BLD: 14.62 10X3/UL (ref 1.78–5.38)
NEUTROPHILS NFR BLD AUTO: 83 % (ref 40–80)
NEUTROPHILS NFR BLD AUTO: 83.7 % (ref 40–80)
OSMOLALITY SERPL CALC.SUM OF ELEC: 286 MOSM/KG (ref 275–300)
OSMOLALITY SERPL CALC.SUM OF ELEC: 286 MOSM/KG (ref 275–300)
PLATELET # BLD: 192 10X3/UL (ref 130–400)
PLATELET # BLD: 194 10X3/UL (ref 130–400)
PMV BLD AUTO: 8.8 FL (ref 7.4–10.4)
PMV BLD AUTO: 9 FL (ref 7.4–10.4)
POTASSIUM SERPL-SCNC: 4.2 MMOL/L (ref 3.5–5.1)
POTASSIUM SERPL-SCNC: 4.4 MMOL/L (ref 3.5–5.1)
PROT SERPL-MCNC: 6.5 G/DL (ref 6.4–8.2)
PROTHROMBIN TIME: 14.4 SECONDS (ref 11.6–15)
RBC # BLD AUTO: 4.02 10X6/UL (ref 4.2–6.1)
RBC # BLD AUTO: 4.1 10X6/UL (ref 4.2–6.1)
SODIUM SERPL-SCNC: 136 MMOL/L (ref 136–145)
SODIUM SERPL-SCNC: 137 MMOL/L (ref 136–145)
WBC # BLD AUTO: 17.2 10X3/UL (ref 4.8–10.8)
WBC # BLD AUTO: 17.6 10X3/UL (ref 4.8–10.8)

## 2021-03-13 PROCEDURE — 05HN33Z INSERTION OF INFUSION DEVICE INTO LEFT INTERNAL JUGULAR VEIN, PERCUTANEOUS APPROACH: ICD-10-PCS | Performed by: INTERNAL MEDICINE

## 2021-03-13 PROCEDURE — 0B9J8ZX DRAINAGE OF LEFT LOWER LUNG LOBE, VIA NATURAL OR ARTIFICIAL OPENING ENDOSCOPIC, DIAGNOSTIC: ICD-10-PCS | Performed by: INTERNAL MEDICINE

## 2021-03-13 PROCEDURE — 0DJ08ZZ INSPECTION OF UPPER INTESTINAL TRACT, VIA NATURAL OR ARTIFICIAL OPENING ENDOSCOPIC: ICD-10-PCS | Performed by: INTERNAL MEDICINE

## 2021-03-13 NOTE — NUR
PT WAS SITTING ON THE SIDE OF BED. NO DISTRESS NOTED. NO COMPLAINTS OF PAIN AT
THAT TIME. RESPIRATIONS WNL. ALERT AND ORIENTED. WIFE AT BEDSIDE. CALL LIGHT
IN REACH

## 2021-03-13 NOTE — NUR
PT HAS BEEN INTUBATED BY ANESTHESIA, FOLLOWING THAT BRONCHOSCOPY WAS
PERFORMED BY DR AMBRIZ WHO STATED THE BLEED IS NOT FROM RESPIRATORY, DR AMBRIZ
THEN SPOKEN ON THE PHONE TO PTS DAUGHTER MARCOS SRINIVASANZAC CALLED AND UPDATED
BY DR AMBRIZ AND THIS NURSE; DR GR STATED HE WOULD BE BY IN A LITTLE BIT TO
SEE PT. ALSO ORDERS FOR CVL CONSENT RECIEVED BY DR AMBRIZ TO PERFORM, CONSENTS
OBTAINED BY PTS DAUGHTER. WILL CONTINUE TO CLOSELY OBSERVE.

## 2021-03-13 NOTE — NUR
AAOX4 UPON ENTERING. ADMINISTERED MORNING MEDICATION AND PRN ATIVAN. TOELRATED
WELL. BREAKFAST AT BEDSIDE. RESTING COMFORTABLY. DENIES ANY NEEDS. BED IN
LOWEST POSITION, BED RAILS X2, CALL LIGHT WITHIN REACH. WILL CONTINUE POC.

## 2021-03-13 NOTE — NUR
PT ARRIVED TO UNIT AT THIS TIME PER DR AMBRIZ ORDERS FOR INTUBATION. HE IS
ALERT AND ORIENTED, NERVOUS, BUT STATES HE UNDERSTANDS WHY HE NEEDS TO BE
INTUBATED. HE HAS BRIGHT RED BLOOD ON HIS BEARD FROM SPITTING OUT BLOOD, HE
REQUESTS A SPIT CUP BECAUSE HE SAYS HE FREQUENTLY SPITS BLOOD UP SINCE MONDAY.
CONNECTED TO MONITORING EQUIPMENT. WILL NOTIFY DR AMBRIZ PT IS HERE FOR FURTHER
ORDERS. PT CALLED WIFE AND NOTIFIED HER WHEN HE ARRIVED TO UNIT. SHE REQUESTS
TO SPEAK WITH DR AMBRIZ, WILL NOTIFY HIM OF THIS TOO.

## 2021-03-13 NOTE — NUR
PT SPIT UP MORE FRESH BLOOD. CALLED AMBRIZ, ORDERED TO PUT ON NPO STATUS AND
TRANSFERRED TO ICU FOR INTUBATION AND BRONCHOSCOPY. WHEELED OVER VIA
WHEELCHAIR. BEDSIDE REPORT GIVEN TO ICU NURSE.

## 2021-03-13 NOTE — NUR
1455 PT 2303, ON VENTALOR WITH DIPRAVAN SEDATION.
1510 124/82 91 21 98% SAT
1515 116/84 94 20 98% SAT
1517 PROCEDURE COMPLETE, DX EGD, NOTHING TO TREAT

## 2021-03-13 NOTE — NUR
PER DR AMBRIZ, OBTAIN CONSENT FOR BRONCHOSCOPY AND CALL ANESTHESIA FOR
INTUBATION, WILL DO BRONCH AFTER INTUBATION.
SPOKE WITH DR NEIL TO PROVIDE UPDATES ON PT. DR GR STATES HE DOES NOT
THINK THE BLEEDING IS GI RELATED, HE THINKS IT IS RESPIRATORY-RELATED, BUT IF
IT IS DETERMINED THAT THE BLEEDING IS GI RELATED TO THEN LET HIM KNOW. HE
STATES THAT IF IT WERE ESOPHAGEAL VARIGIES PT WOULD BE VOMITING UP BLOOD.
ANESTHESIA PAGED AND NOTIFIED FOR PT TO BE INTUBATED.

## 2021-03-13 NOTE — NUR
BEDSIDE CVL PLACED BY DR AMBRIZ, AFTER XRAY HE STATED IS OKAY FOR USE.
ALSO  VOISE TO DO EGD TODAY, CONSENTS OBTAINED FROM PTS DAUGHTER. VSS. WILL
CONTINUE PLAN OF CARE.

## 2021-03-14 VITALS — DIASTOLIC BLOOD PRESSURE: 75 MMHG | SYSTOLIC BLOOD PRESSURE: 119 MMHG

## 2021-03-14 VITALS — SYSTOLIC BLOOD PRESSURE: 115 MMHG | DIASTOLIC BLOOD PRESSURE: 74 MMHG

## 2021-03-14 VITALS — SYSTOLIC BLOOD PRESSURE: 112 MMHG | DIASTOLIC BLOOD PRESSURE: 71 MMHG

## 2021-03-14 VITALS — DIASTOLIC BLOOD PRESSURE: 73 MMHG | SYSTOLIC BLOOD PRESSURE: 112 MMHG

## 2021-03-14 VITALS — SYSTOLIC BLOOD PRESSURE: 117 MMHG | DIASTOLIC BLOOD PRESSURE: 74 MMHG

## 2021-03-14 VITALS — SYSTOLIC BLOOD PRESSURE: 121 MMHG | DIASTOLIC BLOOD PRESSURE: 70 MMHG

## 2021-03-14 VITALS — SYSTOLIC BLOOD PRESSURE: 116 MMHG | DIASTOLIC BLOOD PRESSURE: 74 MMHG

## 2021-03-14 VITALS — DIASTOLIC BLOOD PRESSURE: 69 MMHG | SYSTOLIC BLOOD PRESSURE: 120 MMHG

## 2021-03-14 VITALS — DIASTOLIC BLOOD PRESSURE: 75 MMHG | SYSTOLIC BLOOD PRESSURE: 127 MMHG

## 2021-03-14 VITALS — DIASTOLIC BLOOD PRESSURE: 85 MMHG | SYSTOLIC BLOOD PRESSURE: 151 MMHG

## 2021-03-14 VITALS — SYSTOLIC BLOOD PRESSURE: 121 MMHG | DIASTOLIC BLOOD PRESSURE: 72 MMHG

## 2021-03-14 VITALS — SYSTOLIC BLOOD PRESSURE: 124 MMHG | DIASTOLIC BLOOD PRESSURE: 75 MMHG

## 2021-03-14 VITALS — DIASTOLIC BLOOD PRESSURE: 85 MMHG | SYSTOLIC BLOOD PRESSURE: 154 MMHG

## 2021-03-14 VITALS — DIASTOLIC BLOOD PRESSURE: 95 MMHG | SYSTOLIC BLOOD PRESSURE: 163 MMHG

## 2021-03-14 VITALS — DIASTOLIC BLOOD PRESSURE: 90 MMHG | SYSTOLIC BLOOD PRESSURE: 153 MMHG

## 2021-03-14 VITALS — DIASTOLIC BLOOD PRESSURE: 78 MMHG | SYSTOLIC BLOOD PRESSURE: 131 MMHG

## 2021-03-14 VITALS — DIASTOLIC BLOOD PRESSURE: 71 MMHG | SYSTOLIC BLOOD PRESSURE: 125 MMHG

## 2021-03-14 VITALS — SYSTOLIC BLOOD PRESSURE: 127 MMHG | DIASTOLIC BLOOD PRESSURE: 76 MMHG

## 2021-03-14 VITALS — DIASTOLIC BLOOD PRESSURE: 87 MMHG | SYSTOLIC BLOOD PRESSURE: 150 MMHG

## 2021-03-14 VITALS — SYSTOLIC BLOOD PRESSURE: 115 MMHG | DIASTOLIC BLOOD PRESSURE: 73 MMHG

## 2021-03-14 VITALS — DIASTOLIC BLOOD PRESSURE: 71 MMHG | SYSTOLIC BLOOD PRESSURE: 117 MMHG

## 2021-03-14 VITALS — SYSTOLIC BLOOD PRESSURE: 120 MMHG | DIASTOLIC BLOOD PRESSURE: 74 MMHG

## 2021-03-14 VITALS — DIASTOLIC BLOOD PRESSURE: 73 MMHG | SYSTOLIC BLOOD PRESSURE: 113 MMHG

## 2021-03-14 LAB
ALBUMIN SERPL-MCNC: 3.3 G/DL (ref 3.4–5)
ALP SERPL-CCNC: 100 U/L (ref 30–120)
ALT SERPL-CCNC: 50 U/L (ref 10–68)
ANION GAP SERPL CALC-SCNC: 13.4 MMOL/L (ref 8–16)
BILIRUB SERPL-MCNC: 0.37 MG/DL (ref 0.2–1.3)
BUN SERPL-MCNC: 24 MG/DL (ref 7–18)
CALCIUM SERPL-MCNC: 8.2 MG/DL (ref 8.5–10.1)
CHLORIDE SERPL-SCNC: 102 MMOL/L (ref 98–107)
CO2 SERPL-SCNC: 26.1 MMOL/L (ref 21–32)
CREAT SERPL-MCNC: 1 MG/DL (ref 0.6–1.3)
ERYTHROCYTE [DISTWIDTH] IN BLOOD BY AUTOMATED COUNT: 13.8 % (ref 11.5–14.5)
GLOBULIN SER-MCNC: 3.2 G/L
GLUCOSE SERPL-MCNC: 216 MG/DL (ref 74–106)
HCT VFR BLD CALC: 38.4 % (ref 42–54)
HGB BLD-MCNC: 12.6 G/DL (ref 13.5–17.5)
LYMPHOCYTES # BLD: 1.37 10X3/UL (ref 1.32–3.57)
LYMPHOCYTES NFR BLD AUTO: 17 % (ref 15–50)
MCH RBC QN AUTO: 31.3 PG (ref 26–34)
MCHC RBC AUTO-ENTMCNC: 32.8 G/DL (ref 31–37)
MCV RBC: 95.5 FL (ref 80–100)
NEUTROPHILS # BLD: 13.19 10X3/UL (ref 1.78–5.38)
NEUTROPHILS NFR BLD AUTO: 82 % (ref 40–80)
OSMOLALITY SERPL CALC.SUM OF ELEC: 284 MOSM/KG (ref 275–300)
PLATELET # BLD EST: NORMAL 10*3/UL
PLATELET # BLD: 206 10X3/UL (ref 130–400)
PMV BLD AUTO: 8.8 FL (ref 7.4–10.4)
POTASSIUM SERPL-SCNC: 4.5 MMOL/L (ref 3.5–5.1)
PROT SERPL-MCNC: 6.5 G/DL (ref 6.4–8.2)
RBC # BLD AUTO: 4.02 10X6/UL (ref 4.2–6.1)
SODIUM SERPL-SCNC: 137 MMOL/L (ref 136–145)
WBC # BLD AUTO: 16.7 10X3/UL (ref 4.8–10.8)

## 2021-03-14 NOTE — NUR
TUBE FEEDS STARTED AT THIS TIME PER DR AMBRIZ ORDERS. OGT VERIFICATION VIA
AUSCULTATION/ASPIRATION. VSS. WILL CONTINUE PLAN OF CARE.

## 2021-03-14 NOTE — NUR
PT WIFE CALLED PER PT REQUEST AND MESSAGE DELIVERED, WIFE UPDATED REGARDING
NEW PLAN OF CARE-ALL QUESTIONS ANSWERED.

## 2021-03-14 NOTE — NUR
PT SELF EXTUBATED, SEDATION IMMEDIATELY STOPPED, PT PLACED ON BIPAP 50% PER
RT,
PT DROWSY BUT AROUSES EASILY TO VOICE AND IS ABLE TO FOLLOW
COMMANDS, VSS, SAT 95%, MONITORING FROM BEDSIDE AT THIS TIME. DR AMBRIZ
NOTIFIED, ORDERS RECEIVED.

## 2021-03-14 NOTE — NUR
PTS WIFE CAME BY TO SEE PT AT THIS TIME. UPDATES PROVIDED. VSS. NO ACUTE
DISTRESS NOTED. SHE STATED SHE WILL BE BY TOMORROW AGAIN.

## 2021-03-15 VITALS — SYSTOLIC BLOOD PRESSURE: 164 MMHG | DIASTOLIC BLOOD PRESSURE: 91 MMHG

## 2021-03-15 VITALS — DIASTOLIC BLOOD PRESSURE: 92 MMHG | SYSTOLIC BLOOD PRESSURE: 146 MMHG

## 2021-03-15 VITALS — SYSTOLIC BLOOD PRESSURE: 145 MMHG | DIASTOLIC BLOOD PRESSURE: 86 MMHG

## 2021-03-15 VITALS — DIASTOLIC BLOOD PRESSURE: 90 MMHG | SYSTOLIC BLOOD PRESSURE: 158 MMHG

## 2021-03-15 VITALS — DIASTOLIC BLOOD PRESSURE: 81 MMHG | SYSTOLIC BLOOD PRESSURE: 154 MMHG

## 2021-03-15 VITALS — SYSTOLIC BLOOD PRESSURE: 139 MMHG | DIASTOLIC BLOOD PRESSURE: 88 MMHG

## 2021-03-15 VITALS — DIASTOLIC BLOOD PRESSURE: 99 MMHG | SYSTOLIC BLOOD PRESSURE: 169 MMHG

## 2021-03-15 VITALS — SYSTOLIC BLOOD PRESSURE: 151 MMHG | DIASTOLIC BLOOD PRESSURE: 87 MMHG

## 2021-03-15 VITALS — SYSTOLIC BLOOD PRESSURE: 153 MMHG | DIASTOLIC BLOOD PRESSURE: 83 MMHG

## 2021-03-15 VITALS — SYSTOLIC BLOOD PRESSURE: 112 MMHG | DIASTOLIC BLOOD PRESSURE: 65 MMHG

## 2021-03-15 VITALS — SYSTOLIC BLOOD PRESSURE: 146 MMHG | DIASTOLIC BLOOD PRESSURE: 88 MMHG

## 2021-03-15 VITALS — SYSTOLIC BLOOD PRESSURE: 142 MMHG | DIASTOLIC BLOOD PRESSURE: 82 MMHG

## 2021-03-15 VITALS — SYSTOLIC BLOOD PRESSURE: 153 MMHG | DIASTOLIC BLOOD PRESSURE: 92 MMHG

## 2021-03-15 VITALS — SYSTOLIC BLOOD PRESSURE: 137 MMHG | DIASTOLIC BLOOD PRESSURE: 80 MMHG

## 2021-03-15 VITALS — DIASTOLIC BLOOD PRESSURE: 85 MMHG | SYSTOLIC BLOOD PRESSURE: 140 MMHG

## 2021-03-15 VITALS — SYSTOLIC BLOOD PRESSURE: 127 MMHG | DIASTOLIC BLOOD PRESSURE: 73 MMHG

## 2021-03-15 VITALS — SYSTOLIC BLOOD PRESSURE: 132 MMHG | DIASTOLIC BLOOD PRESSURE: 80 MMHG

## 2021-03-15 VITALS — SYSTOLIC BLOOD PRESSURE: 133 MMHG | DIASTOLIC BLOOD PRESSURE: 79 MMHG

## 2021-03-15 VITALS — DIASTOLIC BLOOD PRESSURE: 72 MMHG | SYSTOLIC BLOOD PRESSURE: 128 MMHG

## 2021-03-15 LAB
ALBUMIN SERPL-MCNC: 3 G/DL (ref 3.4–5)
ALP SERPL-CCNC: 93 U/L (ref 30–120)
ALT SERPL-CCNC: 42 U/L (ref 10–68)
ANION GAP SERPL CALC-SCNC: 10.4 MMOL/L (ref 8–16)
BASOPHILS NFR BLD AUTO: 0.1 % (ref 0–2)
BILIRUB SERPL-MCNC: 0.47 MG/DL (ref 0.2–1.3)
BUN SERPL-MCNC: 25 MG/DL (ref 7–18)
CALCIUM SERPL-MCNC: 8.2 MG/DL (ref 8.5–10.1)
CHLORIDE SERPL-SCNC: 99 MMOL/L (ref 98–107)
CO2 SERPL-SCNC: 30 MMOL/L (ref 21–32)
CREAT SERPL-MCNC: 0.9 MG/DL (ref 0.6–1.3)
EOSINOPHIL NFR BLD: 0 % (ref 0–7)
ERYTHROCYTE [DISTWIDTH] IN BLOOD BY AUTOMATED COUNT: 13.2 % (ref 11.5–14.5)
GLOBULIN SER-MCNC: 2.9 G/L
GLUCOSE SERPL-MCNC: 213 MG/DL (ref 74–106)
HCT VFR BLD CALC: 37.9 % (ref 42–54)
HGB BLD-MCNC: 12.6 G/DL (ref 13.5–17.5)
IMM GRANULOCYTES NFR BLD: 8.3 % (ref 0–5)
LYMPHOCYTES # BLD: 1.2 10X3/UL (ref 1.32–3.57)
LYMPHOCYTES NFR BLD AUTO: 6.9 % (ref 15–50)
MCH RBC QN AUTO: 30.6 PG (ref 26–34)
MCHC RBC AUTO-ENTMCNC: 33.2 G/DL (ref 31–37)
MCV RBC: 92 FL (ref 80–100)
MONOCYTES NFR BLD: 5.5 % (ref 2–11)
NEUTROPHILS # BLD: 13.71 10X3/UL (ref 1.78–5.38)
NEUTROPHILS NFR BLD AUTO: 79.2 % (ref 40–80)
OSMOLALITY SERPL CALC.SUM OF ELEC: 279 MOSM/KG (ref 275–300)
PLATELET # BLD: 191 10X3/UL (ref 130–400)
PMV BLD AUTO: 8.9 FL (ref 7.4–10.4)
POTASSIUM SERPL-SCNC: 4.4 MMOL/L (ref 3.5–5.1)
PROT SERPL-MCNC: 5.9 G/DL (ref 6.4–8.2)
RBC # BLD AUTO: 4.12 10X6/UL (ref 4.2–6.1)
SODIUM SERPL-SCNC: 135 MMOL/L (ref 136–145)
WBC # BLD AUTO: 17.3 10X3/UL (ref 4.8–10.8)

## 2021-03-15 NOTE — NUR
DR LOWERY CONTACTED R/T PT REQUESTING BETTER PAIN CONTROL AND REPORTS HE IS ON
PAIN MANAGEMENT. NEW ORDER REC' FOR MORPHINE 15 MG CR BID. ORDER PLACED ADN
MED GIVEN.

## 2021-03-16 VITALS — SYSTOLIC BLOOD PRESSURE: 125 MMHG | DIASTOLIC BLOOD PRESSURE: 80 MMHG

## 2021-03-16 VITALS — SYSTOLIC BLOOD PRESSURE: 135 MMHG | DIASTOLIC BLOOD PRESSURE: 60 MMHG

## 2021-03-16 VITALS — SYSTOLIC BLOOD PRESSURE: 137 MMHG | DIASTOLIC BLOOD PRESSURE: 75 MMHG

## 2021-03-16 VITALS — SYSTOLIC BLOOD PRESSURE: 137 MMHG | DIASTOLIC BLOOD PRESSURE: 63 MMHG

## 2021-03-16 VITALS — DIASTOLIC BLOOD PRESSURE: 82 MMHG | SYSTOLIC BLOOD PRESSURE: 167 MMHG

## 2021-03-16 VITALS — SYSTOLIC BLOOD PRESSURE: 120 MMHG | DIASTOLIC BLOOD PRESSURE: 82 MMHG

## 2021-03-16 LAB
ANION GAP SERPL CALC-SCNC: 8.4 MMOL/L (ref 8–16)
APTT BLD: 26.3 SECONDS (ref 22.8–39.4)
BASOPHILS NFR BLD AUTO: 0.2 % (ref 0–2)
BUN SERPL-MCNC: 27 MG/DL (ref 7–18)
CALCIUM SERPL-MCNC: 8.5 MG/DL (ref 8.5–10.1)
CHLORIDE SERPL-SCNC: 99 MMOL/L (ref 98–107)
CO2 SERPL-SCNC: 31.5 MMOL/L (ref 21–32)
CREAT SERPL-MCNC: 1.1 MG/DL (ref 0.6–1.3)
EOSINOPHIL NFR BLD: 0 % (ref 0–7)
ERYTHROCYTE [DISTWIDTH] IN BLOOD BY AUTOMATED COUNT: 13 % (ref 11.5–14.5)
EST. AVERAGE GLUCOSE BLD GHB EST-MCNC: 146 MG/DL (ref 74–154)
GLUCOSE SERPL-MCNC: 271 MG/DL (ref 74–106)
HCT VFR BLD CALC: 38.2 % (ref 42–54)
HGB BLD-MCNC: 13.4 G/DL (ref 13.5–17.5)
IMM GRANULOCYTES NFR BLD: 10.6 % (ref 0–5)
INR PPP: 1.19 (ref 0.85–1.17)
LYMPHOCYTES # BLD: 1.48 10X3/UL (ref 1.32–3.57)
LYMPHOCYTES NFR BLD AUTO: 8.9 % (ref 15–50)
MAGNESIUM SERPL-MCNC: 2.1 MG/DL (ref 1.8–2.4)
MCH RBC QN AUTO: 31.3 PG (ref 26–34)
MCHC RBC AUTO-ENTMCNC: 35.1 G/DL (ref 31–37)
MCV RBC: 89.3 FL (ref 80–100)
MONOCYTES NFR BLD: 6.4 % (ref 2–11)
NEUTROPHILS # BLD: 12.27 10X3/UL (ref 1.78–5.38)
NEUTROPHILS NFR BLD AUTO: 73.9 % (ref 40–80)
OSMOLALITY SERPL CALC.SUM OF ELEC: 284 MOSM/KG (ref 275–300)
PLATELET # BLD: 160 10X3/UL (ref 130–400)
PMV BLD AUTO: 8.8 FL (ref 7.4–10.4)
POTASSIUM SERPL-SCNC: 3.9 MMOL/L (ref 3.5–5.1)
PROTHROMBIN TIME: 14 SECONDS (ref 11.6–15)
RBC # BLD AUTO: 4.28 10X6/UL (ref 4.2–6.1)
SODIUM SERPL-SCNC: 135 MMOL/L (ref 136–145)
SPECIMEN PREPARATION: (no result)
WBC # BLD AUTO: 16.6 10X3/UL (ref 4.8–10.8)

## 2021-03-16 NOTE — NUR
RECEIVED BEDSIDE REPORT. PT LAYING IN BED, A&O X4. CVL TO LEFT IJ, PATENT AND
INFUSING, NO REDNESS OR SWELLING. O2 SAT 97% ON 2L, VIA NC. PT ABLE TO
AMBULATE AD CHELSEA. EDUCATED PT ON CL AND NEEDS, VERBALIZED UNDERSTANDING. BED
LOW, CL IN REACH.

## 2021-03-16 NOTE — NUR
OT NOTE: SEVERAL ATTEMPTS TO EVAL PT BUT UNABLE TO COMPLETE.
WILL RE ATTEMPT TOMORROW.
KWAME MARTINEZ, OTR/L

## 2021-03-17 VITALS — DIASTOLIC BLOOD PRESSURE: 87 MMHG | SYSTOLIC BLOOD PRESSURE: 175 MMHG

## 2021-03-17 VITALS — SYSTOLIC BLOOD PRESSURE: 167 MMHG | DIASTOLIC BLOOD PRESSURE: 100 MMHG

## 2021-03-17 VITALS — DIASTOLIC BLOOD PRESSURE: 87 MMHG | SYSTOLIC BLOOD PRESSURE: 143 MMHG

## 2021-03-17 VITALS — DIASTOLIC BLOOD PRESSURE: 83 MMHG | SYSTOLIC BLOOD PRESSURE: 133 MMHG

## 2021-03-17 VITALS — DIASTOLIC BLOOD PRESSURE: 94 MMHG | SYSTOLIC BLOOD PRESSURE: 130 MMHG

## 2021-03-17 LAB
ANION GAP SERPL CALC-SCNC: 12.7 MMOL/L (ref 8–16)
BUN SERPL-MCNC: 26 MG/DL (ref 7–18)
CALCIUM SERPL-MCNC: 8.6 MG/DL (ref 8.5–10.1)
CHLORIDE SERPL-SCNC: 101 MMOL/L (ref 98–107)
CO2 SERPL-SCNC: 27 MMOL/L (ref 21–32)
CREAT SERPL-MCNC: 1 MG/DL (ref 0.6–1.3)
ERYTHROCYTE [DISTWIDTH] IN BLOOD BY AUTOMATED COUNT: 13.4 % (ref 11.5–14.5)
GLUCOSE SERPL-MCNC: 226 MG/DL (ref 74–106)
HCT VFR BLD CALC: 40.4 % (ref 42–54)
HGB BLD-MCNC: 13.9 G/DL (ref 13.5–17.5)
LYMPHOCYTES # BLD: 2.72 10X3/UL (ref 1.32–3.57)
LYMPHOCYTES NFR BLD AUTO: 27 % (ref 15–50)
MAGNESIUM SERPL-MCNC: 2.2 MG/DL (ref 1.8–2.4)
MCH RBC QN AUTO: 31.2 PG (ref 26–34)
MCHC RBC AUTO-ENTMCNC: 34.4 G/DL (ref 31–37)
MCV RBC: 90.8 FL (ref 80–100)
MONOCYTES NFR BLD: 13 % (ref 2–11)
NEUTROPHILS # BLD: 11.82 10X3/UL (ref 1.78–5.38)
NEUTROPHILS NFR BLD AUTO: 52 % (ref 40–80)
OSMOLALITY SERPL CALC.SUM OF ELEC: 285 MOSM/KG (ref 275–300)
PLATELET # BLD EST: NORMAL 10*3/UL
PLATELET # BLD: 140 10X3/UL (ref 130–400)
PMV BLD AUTO: 8.8 FL (ref 7.4–10.4)
POTASSIUM SERPL-SCNC: 3.7 MMOL/L (ref 3.5–5.1)
RBC # BLD AUTO: 4.45 10X6/UL (ref 4.2–6.1)
SODIUM SERPL-SCNC: 137 MMOL/L (ref 136–145)
WBC # BLD AUTO: 18.3 10X3/UL (ref 4.8–10.8)

## 2021-03-17 NOTE — NUR
bedside shift report completed RT in room with pt, pt with no distress noted,
will contine to monitor

## 2021-03-17 NOTE — NUR
REC'D WALKING ROUNDS CHGE OF SHIFT.MET COMING BACK FROM VENDING MACHINE EATING
CUP ICECREAM.CHIPS, COOKIES SKINS HONEY BUN DECLINED FSBS AT  WILL CONTINUE
TO MONITOR FOR ANY CHGES AND FOLLOW CURRENT PLAN OF CARE.

## 2021-03-17 NOTE — NUR
Nutrition follow-up:
Pt receiving a regular diet
PO intake fair to good at some meals
Labs reviewed
Wt: 237#
Pt will be NPO with intubation for Bronch 3/18/21
RDN follow-up: 3/19/21

## 2021-03-18 VITALS — SYSTOLIC BLOOD PRESSURE: 131 MMHG | DIASTOLIC BLOOD PRESSURE: 76 MMHG

## 2021-03-18 VITALS — SYSTOLIC BLOOD PRESSURE: 131 MMHG | DIASTOLIC BLOOD PRESSURE: 78 MMHG

## 2021-03-18 VITALS — DIASTOLIC BLOOD PRESSURE: 82 MMHG | SYSTOLIC BLOOD PRESSURE: 130 MMHG

## 2021-03-18 VITALS — DIASTOLIC BLOOD PRESSURE: 79 MMHG | SYSTOLIC BLOOD PRESSURE: 147 MMHG

## 2021-03-18 VITALS — SYSTOLIC BLOOD PRESSURE: 109 MMHG | DIASTOLIC BLOOD PRESSURE: 64 MMHG

## 2021-03-18 VITALS — SYSTOLIC BLOOD PRESSURE: 138 MMHG | DIASTOLIC BLOOD PRESSURE: 70 MMHG

## 2021-03-18 LAB
ANION GAP SERPL CALC-SCNC: 11.8 MMOL/L (ref 8–16)
APTT BLD: 26.8 SECONDS (ref 22.8–39.4)
BASOPHILS NFR BLD AUTO: 0.3 % (ref 0–2)
BUN SERPL-MCNC: 25 MG/DL (ref 7–18)
CALCIUM SERPL-MCNC: 8.7 MG/DL (ref 8.5–10.1)
CHLORIDE SERPL-SCNC: 98 MMOL/L (ref 98–107)
CO2 SERPL-SCNC: 27.9 MMOL/L (ref 21–32)
CREAT SERPL-MCNC: 1.1 MG/DL (ref 0.6–1.3)
EOSINOPHIL NFR BLD: 0 % (ref 0–7)
ERYTHROCYTE [DISTWIDTH] IN BLOOD BY AUTOMATED COUNT: 13.2 % (ref 11.5–14.5)
GLUCOSE SERPL-MCNC: 289 MG/DL (ref 74–106)
HCT VFR BLD CALC: 40.1 % (ref 42–54)
HGB BLD-MCNC: 13.8 G/DL (ref 13.5–17.5)
IMM GRANULOCYTES NFR BLD: 11.5 % (ref 0–5)
INR PPP: 1.19 (ref 0.85–1.17)
LYMPHOCYTES # BLD: 1.37 10X3/UL (ref 1.32–3.57)
LYMPHOCYTES NFR BLD AUTO: 6.4 % (ref 15–50)
MACROPHAGES NFR FLD MANUAL: 7 %
MAGNESIUM SERPL-MCNC: 2.4 MG/DL (ref 1.8–2.4)
MCH RBC QN AUTO: 31 PG (ref 26–34)
MCHC RBC AUTO-ENTMCNC: 34.4 G/DL (ref 31–37)
MCV RBC: 90.1 FL (ref 80–100)
MONOCYTES NFR BLD: 5.5 % (ref 2–11)
NEUTROPHILS # BLD: 16.39 10X3/UL (ref 1.78–5.38)
NEUTROPHILS NFR BLD AUTO: 76.3 % (ref 40–80)
NEUTROPHILS NFR FLD MANUAL: 84 %
OSMOLALITY SERPL CALC.SUM OF ELEC: 280 MOSM/KG (ref 275–300)
PLATELET # BLD: 161 10X3/UL (ref 130–400)
PMV BLD AUTO: 8.5 FL (ref 7.4–10.4)
POTASSIUM SERPL-SCNC: 4.7 MMOL/L (ref 3.5–5.1)
PROTHROMBIN TIME: 14 SECONDS (ref 11.6–15)
RBC # BLD AUTO: 4.45 10X6/UL (ref 4.2–6.1)
SODIUM SERPL-SCNC: 133 MMOL/L (ref 136–145)
WBC # BLD AUTO: 21.5 10X3/UL (ref 4.8–10.8)

## 2021-03-18 PROCEDURE — 0B918ZX DRAINAGE OF TRACHEA, VIA NATURAL OR ARTIFICIAL OPENING ENDOSCOPIC, DIAGNOSTIC: ICD-10-PCS | Performed by: INTERNAL MEDICINE

## 2021-03-18 PROCEDURE — 0B9M8ZX DRAINAGE OF BILATERAL LUNGS, VIA NATURAL OR ARTIFICIAL OPENING ENDOSCOPIC, DIAGNOSTIC: ICD-10-PCS | Performed by: INTERNAL MEDICINE

## 2021-03-18 NOTE — NUR
OT NOTE: PT COMPLETED EOB SITTING WITH MOD I. PT COMPLETED BED MOB WITH SBA.
PT COMPLETED ADL MOB SBA. PT EXHIBITED BUE AROM WITH FUNCTIONAL TASKS. PT DID
WELL.
THANK YOU,ALEKS DE LA CRUZ

## 2021-03-18 NOTE — NUR
spoke to dr ospina notified of pt with 1/4 c bright red hemoptysis
new orders to give racimic/epi UD, and CXR continue to
monitor

## 2021-03-19 VITALS — DIASTOLIC BLOOD PRESSURE: 72 MMHG | SYSTOLIC BLOOD PRESSURE: 122 MMHG

## 2021-03-19 VITALS — SYSTOLIC BLOOD PRESSURE: 123 MMHG | DIASTOLIC BLOOD PRESSURE: 72 MMHG

## 2021-03-19 VITALS — SYSTOLIC BLOOD PRESSURE: 149 MMHG | DIASTOLIC BLOOD PRESSURE: 94 MMHG

## 2021-03-19 LAB
ANION GAP SERPL CALC-SCNC: 10.4 MMOL/L (ref 8–16)
APTT BLD: 24.8 SECONDS (ref 22.8–39.4)
BUN SERPL-MCNC: 26 MG/DL (ref 7–18)
CALCIUM SERPL-MCNC: 8.7 MG/DL (ref 8.5–10.1)
CHLORIDE SERPL-SCNC: 96 MMOL/L (ref 98–107)
CO2 SERPL-SCNC: 30.3 MMOL/L (ref 21–32)
CREAT SERPL-MCNC: 1.2 MG/DL (ref 0.6–1.3)
ERYTHROCYTE [DISTWIDTH] IN BLOOD BY AUTOMATED COUNT: 13.5 % (ref 11.5–14.5)
GLUCOSE SERPL-MCNC: 305 MG/DL (ref 74–106)
HCT VFR BLD CALC: 40.6 % (ref 42–54)
HGB BLD-MCNC: 13.8 G/DL (ref 13.5–17.5)
INR PPP: 1.16 (ref 0.85–1.17)
LYMPHOCYTES # BLD: 1.8 10X3/UL (ref 1.32–3.57)
LYMPHOCYTES NFR BLD AUTO: 21 % (ref 15–50)
MAGNESIUM SERPL-MCNC: 2.2 MG/DL (ref 1.8–2.4)
MCH RBC QN AUTO: 31 PG (ref 26–34)
MCHC RBC AUTO-ENTMCNC: 34 G/DL (ref 31–37)
MCV RBC: 91.2 FL (ref 80–100)
MONOCYTES NFR BLD: 1 % (ref 2–11)
NEUTROPHILS # BLD: 21.38 10X3/UL (ref 1.78–5.38)
NEUTROPHILS NFR BLD AUTO: 71 % (ref 40–80)
OSMOLALITY SERPL CALC.SUM OF ELEC: 280 MOSM/KG (ref 275–300)
PLATELET # BLD EST: (no result) 10*3/UL
PLATELET # BLD: 165 10X3/UL (ref 130–400)
PMV BLD AUTO: 8.9 FL (ref 7.4–10.4)
POTASSIUM SERPL-SCNC: 4.7 MMOL/L (ref 3.5–5.1)
PROTHROMBIN TIME: 13.7 SECONDS (ref 11.6–15)
RBC # BLD AUTO: 4.45 10X6/UL (ref 4.2–6.1)
SODIUM SERPL-SCNC: 132 MMOL/L (ref 136–145)
SPECIMEN PREPARATION: (no result)
WBC # BLD AUTO: 27.3 10X3/UL (ref 4.8–10.8)

## 2021-03-19 NOTE — NUR
PATIENT COMING OUT TO DESK TO TALK TO NURSE. MADE PATIENT AWARE AGAIN THAT
NURSE IS IN ANOTHER ROOM AND WILL SEE HIM AFTER.

## 2021-03-19 NOTE — NUR
Nutrition follow-up:
Diet order:  NPO for procedure
Pt was receiving a regular diet as tolerated
PO intake good- 100% of most meals
Labs reviewed
Wt: 235#
RDN will follow-up: 3/22/21

## 2021-03-19 NOTE — MORECARE
CASE MANAGEMENT DISCHARGE SUMMARY
 
 
PATIENT: JASON HIGGINS                   UNIT: [id number]
ACCOUNT#: [id number]                       ADM DATE: 21
AGE: 50     : 68  SEX: M            ROOM/BED: D.2209    
AUTHOR: ANUPAMA,DOC                             PHYSICIAN:                               
 
REFERRING PHYSICIAN: SAMM MONSON MD              
DATE OF SERVICE: 21
Case Management Discharge Planning Summary
 
 
 
 
 
 
DCP REVIEW SUMMARY
ANTICIPATED D/C DATE: 
EXPECTED LOS : 
CASE STATUS:  DCP Initiated
INITIAL REVIEW:  3/16/2021
INITIAL REVIEWER:   Yolie Baez
FINAL DISCHARGE DISPOSITION:  : 
FINAL REVIEWER:  
FINAL REVIEW DATE: 
 
  
 
DCP Focus Questions & Answers
 -   Added on:  
QUESTION: ANSWER
 
 : 
 
 
PROVIDER NETWORKING
REVIEW DATE:  2021
SERVICE TYPE:  Durable Medical Equipment
REVIEWER:  Yolie Baez
PROVIDER:  
FINAL PROVIDER? :  
FINAL DATE/TIME:    CT
 
 
 
PATIENT:  JASON HIGGINS
ENCOUNTER:  [id number]
MEDICAL RECORD#:  [id number]
ADMISSION DATE:  2021
 
DISCHARGE DATE:  
ATTENDING MD:  SAMM HOGUE
:   
AGE:  50
MARITAL STATUS:   S
DC PLAN ID:  [id number]
FACILITY:   [facility]
PRINTED ON: 3/19/21  14:33  CT
 
 
 
 
 
 
 
 
**All edits/amendments must be made on the electronic document**
 
DICTATION DATE: 21     : DMITRY  21     
RPT#: [id number]                                DC DATE:        
                                               STATUS: ADM IN  
[facility]
 [place] [address]
***END OF REPORT***

## 2021-03-19 NOTE — NUR
PATIENT IS VERY ANXIOUS ABOUT PROCEDURE, STATED HE CALLED HIS FATHER AND
 AND BOTH ADVISED HIM TO GET A SECOND OPINION AND HAVE PROCEDURE DONE AT
A LATER TIME, PATIENT HAD CALLED [facility] SEVERAL TIMES THROUGHOUT THE
EVENING AND THIS MORNING CAUSING DIRECTOR TO COME AND SPEAK TO THEE. PATIENT
IS READY FOR DC HOME, IR DOCTOR AND LILY TORRES CAME TO TALK TO
PATIENT.SPOKE TO MARIZA ZEPEDA AND PATIENT NEEDS TO SEE PULMONOLOGIST BEFORE
AGREEABLE TO DC. CONTINUE WITH PLAN OF CARE

## 2021-03-19 NOTE — NUR
PATIENT REFUSES TO TAKE ABX AND FLUIDS. STATES WANTS TO DC AND WANTS IV OUT.
MARIZA GUZMAN AWARE AND STATES NOT DC PATIENT AT THIS TIME. PATIENT AWARE AND
ANGRY BUT STATES NOT SIGNING AMA. STATES WANTS TO BE DISCHARGED. STATES "THIS
IS GETTING RIDICULOUS REALLY FAST." STATES WANTS TO TALK TO ESPINOZA, PATIENT'S
NURSE. PATIENT AWARE THAT NURSE IS IN ANOTHER ROOM AND WILL COME SEE HIM
AFTER.

## 2021-03-19 NOTE — MORECARE
CASE MANAGEMENT DISCHARGE SUMMARY
 
 
PATIENT: JASON HIGGINS                   UNIT: [id number]
ACCOUNT#: [id number]                       ADM DATE: 21
AGE: 50     : 68  SEX: M            ROOM/BED: D.2209    
AUTHOR: ANUPAMA,DOC                             PHYSICIAN:                               
 
REFERRING PHYSICIAN: SAMM MONSON MD              
DATE OF SERVICE: 21
Case Management Discharge Planning Summary
 
 
 
 
COMMENTS 
ENTERED DATE:  3/19/21  14:33 CT
COMMENT TYPE:  Discharge Planning
REVIEWER: Yolie Baez
 
CM met with patient at bedside after obtaining verbal consent. CM discussed 
availability / needs of home health, REHAB and medical equipment. PATIENT 
DENIES ANY DISCHARGE NEEDS OTHER THAN A NEBULIZER. I HAVE FAXED ORDER TO 
[facility] [place] AND THEY WILL DELIVER TO PATIENT'S HOUSE. IMM SIGNED AND PLACED ON 
CHART. CM TO FOLLOW AND ASSIST AS NEEDED.
 
 
DCP REVIEW SUMMARY
ANTICIPATED D/C DATE: 
EXPECTED LOS : 
CASE STATUS:  DCP Initiated
INITIAL REVIEW:  3/16/2021
INITIAL REVIEWER:   Yolie Baez
FINAL DISCHARGE DISPOSITION:  : 
FINAL REVIEWER:  
FINAL REVIEW DATE: 
 
  
 
DCP Focus Questions & Answers
 -   Added on:  
QUESTION: ANSWER
 
 : 
 
 
PROVIDER NETWORKING
REVIEW DATE:  2021
SERVICE TYPE:  Durable Medical Equipment
REVIEWER:  Yolie Baez
 
PROVIDER:  
FINAL PROVIDER? :  
FINAL DATE/TIME:    CT
 
 
 
PATIENT:  JASON HIGGINS
ENCOUNTER:  [id number]
MEDICAL RECORD#:  [id number]
ADMISSION DATE:  2021
DISCHARGE DATE:  
ATTENDING MD:  SAMM HOGUE
:   
AGE:  50
MARITAL STATUS:   S
DC PLAN ID:  [id number]
FACILITY:   [facility]
PRINTED ON: 3/19/21  14:47  CT
 
 
 
 
 
 
 
 
**All edits/amendments must be made on the electronic document**
 
DICTATION DATE: 21     : DM  21     
RPT#: [id number]                                DC DATE:        
                                               STATUS: ADM IN  
[facility]
 [place] [address]
***END OF REPORT***

## 2021-03-19 NOTE — NUR
PT WAS ASSESSED AT THE BEGINNING OF THE SHIFT. HE IS ALERT AND ORIENTED, ABLE
TO VERBALIZE NEEDS. HE WAS IN BED ASLEEP WITH WIFE ALL NIGHT UNTIL WE WOKE
THEM UP FOR HIS HIBICLENS SHOWER AND BED CHANGE. WE THEN CHANGED THE LINENS
AND HE REFUSED TO LEAVE HIS PJ BOTTOMS OFF AND THE BED WAS THEN DIRTY AGAIN.
HE KNOWS HE IS NPO BUT WE FEEL HE HAS HAD A LITTLE BIT OF COFFEE.

## 2021-03-21 ENCOUNTER — HOSPITAL ENCOUNTER (INPATIENT)
Dept: HOSPITAL 84 - D.ER | Age: 51
LOS: 12 days | Discharge: HOME | DRG: 871 | End: 2021-04-02
Attending: FAMILY MEDICINE | Admitting: FAMILY MEDICINE
Payer: MEDICARE

## 2021-03-21 VITALS — SYSTOLIC BLOOD PRESSURE: 118 MMHG | DIASTOLIC BLOOD PRESSURE: 75 MMHG

## 2021-03-21 VITALS — DIASTOLIC BLOOD PRESSURE: 56 MMHG | SYSTOLIC BLOOD PRESSURE: 93 MMHG

## 2021-03-21 VITALS — SYSTOLIC BLOOD PRESSURE: 101 MMHG | DIASTOLIC BLOOD PRESSURE: 64 MMHG

## 2021-03-21 VITALS — SYSTOLIC BLOOD PRESSURE: 92 MMHG | DIASTOLIC BLOOD PRESSURE: 47 MMHG

## 2021-03-21 VITALS — DIASTOLIC BLOOD PRESSURE: 43 MMHG | SYSTOLIC BLOOD PRESSURE: 95 MMHG

## 2021-03-21 VITALS — SYSTOLIC BLOOD PRESSURE: 96 MMHG | DIASTOLIC BLOOD PRESSURE: 55 MMHG

## 2021-03-21 VITALS — SYSTOLIC BLOOD PRESSURE: 104 MMHG | DIASTOLIC BLOOD PRESSURE: 68 MMHG

## 2021-03-21 VITALS — SYSTOLIC BLOOD PRESSURE: 94 MMHG | DIASTOLIC BLOOD PRESSURE: 56 MMHG

## 2021-03-21 VITALS — DIASTOLIC BLOOD PRESSURE: 59 MMHG | SYSTOLIC BLOOD PRESSURE: 84 MMHG

## 2021-03-21 VITALS — SYSTOLIC BLOOD PRESSURE: 86 MMHG | DIASTOLIC BLOOD PRESSURE: 68 MMHG

## 2021-03-21 VITALS — DIASTOLIC BLOOD PRESSURE: 75 MMHG | SYSTOLIC BLOOD PRESSURE: 118 MMHG

## 2021-03-21 VITALS — DIASTOLIC BLOOD PRESSURE: 56 MMHG | SYSTOLIC BLOOD PRESSURE: 108 MMHG

## 2021-03-21 VITALS — SYSTOLIC BLOOD PRESSURE: 110 MMHG | DIASTOLIC BLOOD PRESSURE: 53 MMHG

## 2021-03-21 VITALS — SYSTOLIC BLOOD PRESSURE: 77 MMHG | DIASTOLIC BLOOD PRESSURE: 49 MMHG

## 2021-03-21 VITALS — DIASTOLIC BLOOD PRESSURE: 56 MMHG | SYSTOLIC BLOOD PRESSURE: 100 MMHG

## 2021-03-21 VITALS — DIASTOLIC BLOOD PRESSURE: 38 MMHG | SYSTOLIC BLOOD PRESSURE: 113 MMHG

## 2021-03-21 VITALS — DIASTOLIC BLOOD PRESSURE: 57 MMHG | SYSTOLIC BLOOD PRESSURE: 98 MMHG

## 2021-03-21 VITALS — SYSTOLIC BLOOD PRESSURE: 98 MMHG | DIASTOLIC BLOOD PRESSURE: 52 MMHG

## 2021-03-21 VITALS — DIASTOLIC BLOOD PRESSURE: 45 MMHG | SYSTOLIC BLOOD PRESSURE: 101 MMHG

## 2021-03-21 VITALS — SYSTOLIC BLOOD PRESSURE: 98 MMHG | DIASTOLIC BLOOD PRESSURE: 48 MMHG

## 2021-03-21 VITALS
BODY MASS INDEX: 33.46 KG/M2 | WEIGHT: 239 LBS | BODY MASS INDEX: 33.46 KG/M2 | BODY MASS INDEX: 33.46 KG/M2 | HEIGHT: 71 IN | HEIGHT: 71 IN | WEIGHT: 239 LBS

## 2021-03-21 VITALS — DIASTOLIC BLOOD PRESSURE: 46 MMHG | SYSTOLIC BLOOD PRESSURE: 86 MMHG

## 2021-03-21 VITALS — DIASTOLIC BLOOD PRESSURE: 62 MMHG | SYSTOLIC BLOOD PRESSURE: 76 MMHG

## 2021-03-21 VITALS — SYSTOLIC BLOOD PRESSURE: 90 MMHG | DIASTOLIC BLOOD PRESSURE: 48 MMHG

## 2021-03-21 VITALS — SYSTOLIC BLOOD PRESSURE: 100 MMHG | DIASTOLIC BLOOD PRESSURE: 63 MMHG

## 2021-03-21 VITALS — SYSTOLIC BLOOD PRESSURE: 101 MMHG | DIASTOLIC BLOOD PRESSURE: 78 MMHG

## 2021-03-21 VITALS — DIASTOLIC BLOOD PRESSURE: 49 MMHG | SYSTOLIC BLOOD PRESSURE: 97 MMHG

## 2021-03-21 VITALS — SYSTOLIC BLOOD PRESSURE: 104 MMHG | DIASTOLIC BLOOD PRESSURE: 54 MMHG

## 2021-03-21 VITALS — SYSTOLIC BLOOD PRESSURE: 84 MMHG | DIASTOLIC BLOOD PRESSURE: 49 MMHG

## 2021-03-21 VITALS — SYSTOLIC BLOOD PRESSURE: 85 MMHG | DIASTOLIC BLOOD PRESSURE: 54 MMHG

## 2021-03-21 VITALS — SYSTOLIC BLOOD PRESSURE: 100 MMHG | DIASTOLIC BLOOD PRESSURE: 54 MMHG

## 2021-03-21 VITALS — SYSTOLIC BLOOD PRESSURE: 77 MMHG | DIASTOLIC BLOOD PRESSURE: 40 MMHG

## 2021-03-21 VITALS — SYSTOLIC BLOOD PRESSURE: 92 MMHG | DIASTOLIC BLOOD PRESSURE: 55 MMHG

## 2021-03-21 VITALS — SYSTOLIC BLOOD PRESSURE: 111 MMHG | DIASTOLIC BLOOD PRESSURE: 58 MMHG

## 2021-03-21 VITALS — SYSTOLIC BLOOD PRESSURE: 96 MMHG | DIASTOLIC BLOOD PRESSURE: 61 MMHG

## 2021-03-21 VITALS — DIASTOLIC BLOOD PRESSURE: 66 MMHG | SYSTOLIC BLOOD PRESSURE: 106 MMHG

## 2021-03-21 VITALS — SYSTOLIC BLOOD PRESSURE: 91 MMHG | DIASTOLIC BLOOD PRESSURE: 53 MMHG

## 2021-03-21 VITALS — SYSTOLIC BLOOD PRESSURE: 90 MMHG | DIASTOLIC BLOOD PRESSURE: 49 MMHG

## 2021-03-21 VITALS — SYSTOLIC BLOOD PRESSURE: 106 MMHG | DIASTOLIC BLOOD PRESSURE: 55 MMHG

## 2021-03-21 VITALS — DIASTOLIC BLOOD PRESSURE: 58 MMHG | SYSTOLIC BLOOD PRESSURE: 111 MMHG

## 2021-03-21 VITALS — DIASTOLIC BLOOD PRESSURE: 61 MMHG | SYSTOLIC BLOOD PRESSURE: 122 MMHG

## 2021-03-21 VITALS — SYSTOLIC BLOOD PRESSURE: 95 MMHG | DIASTOLIC BLOOD PRESSURE: 50 MMHG

## 2021-03-21 VITALS — SYSTOLIC BLOOD PRESSURE: 85 MMHG | DIASTOLIC BLOOD PRESSURE: 51 MMHG

## 2021-03-21 VITALS — DIASTOLIC BLOOD PRESSURE: 60 MMHG | SYSTOLIC BLOOD PRESSURE: 107 MMHG

## 2021-03-21 VITALS — SYSTOLIC BLOOD PRESSURE: 82 MMHG | DIASTOLIC BLOOD PRESSURE: 49 MMHG

## 2021-03-21 VITALS — DIASTOLIC BLOOD PRESSURE: 55 MMHG | SYSTOLIC BLOOD PRESSURE: 94 MMHG

## 2021-03-21 VITALS — DIASTOLIC BLOOD PRESSURE: 80 MMHG | SYSTOLIC BLOOD PRESSURE: 101 MMHG

## 2021-03-21 VITALS — DIASTOLIC BLOOD PRESSURE: 50 MMHG | SYSTOLIC BLOOD PRESSURE: 77 MMHG

## 2021-03-21 VITALS — SYSTOLIC BLOOD PRESSURE: 101 MMHG | DIASTOLIC BLOOD PRESSURE: 45 MMHG

## 2021-03-21 VITALS — SYSTOLIC BLOOD PRESSURE: 86 MMHG | DIASTOLIC BLOOD PRESSURE: 37 MMHG

## 2021-03-21 VITALS — SYSTOLIC BLOOD PRESSURE: 99 MMHG | DIASTOLIC BLOOD PRESSURE: 65 MMHG

## 2021-03-21 VITALS — DIASTOLIC BLOOD PRESSURE: 47 MMHG | SYSTOLIC BLOOD PRESSURE: 88 MMHG

## 2021-03-21 VITALS — DIASTOLIC BLOOD PRESSURE: 53 MMHG | SYSTOLIC BLOOD PRESSURE: 90 MMHG

## 2021-03-21 VITALS — DIASTOLIC BLOOD PRESSURE: 42 MMHG | SYSTOLIC BLOOD PRESSURE: 86 MMHG

## 2021-03-21 VITALS — DIASTOLIC BLOOD PRESSURE: 60 MMHG | SYSTOLIC BLOOD PRESSURE: 111 MMHG

## 2021-03-21 DIAGNOSIS — A41.9: Primary | ICD-10-CM

## 2021-03-21 DIAGNOSIS — J96.91: ICD-10-CM

## 2021-03-21 DIAGNOSIS — E79.0: ICD-10-CM

## 2021-03-21 DIAGNOSIS — N17.9: ICD-10-CM

## 2021-03-21 DIAGNOSIS — T40.2X1A: ICD-10-CM

## 2021-03-21 DIAGNOSIS — B18.2: ICD-10-CM

## 2021-03-21 DIAGNOSIS — R65.21: ICD-10-CM

## 2021-03-21 DIAGNOSIS — J18.9: ICD-10-CM

## 2021-03-21 DIAGNOSIS — E87.5: ICD-10-CM

## 2021-03-21 DIAGNOSIS — I10: ICD-10-CM

## 2021-03-21 DIAGNOSIS — Z91.19: ICD-10-CM

## 2021-03-21 DIAGNOSIS — F17.203: ICD-10-CM

## 2021-03-21 DIAGNOSIS — R04.2: ICD-10-CM

## 2021-03-21 DIAGNOSIS — K74.60: ICD-10-CM

## 2021-03-21 DIAGNOSIS — G92: ICD-10-CM

## 2021-03-21 DIAGNOSIS — E87.2: ICD-10-CM

## 2021-03-21 LAB
ALBUMIN SERPL-MCNC: 3.2 G/DL (ref 3.4–5)
ALP SERPL-CCNC: 109 U/L (ref 30–120)
ALT SERPL-CCNC: 47 U/L (ref 10–68)
AMPHETAMINES UR QL SCN: NEGATIVE QUAL
AMYLASE SERPL-CCNC: 117 U/L (ref 25–115)
ANION GAP SERPL CALC-SCNC: 14.6 MMOL/L (ref 8–16)
ANION GAP SERPL CALC-SCNC: 15.9 MMOL/L (ref 8–16)
APAP SERPL-MCNC: 0 UG/ML (ref 10–30)
APTT BLD: 34.1 SECONDS (ref 22.8–39.4)
BACTERIA #/AREA URNS HPF: (no result) HPF
BARBITURATES UR QL SCN: NEGATIVE QUAL
BENZODIAZ UR QL SCN: POSITIVE QUAL
BILIRUB SERPL-MCNC: 0.91 MG/DL (ref 0.2–1.3)
BILIRUB SERPL-MCNC: NEGATIVE MG/DL
BUN SERPL-MCNC: 49 MG/DL (ref 7–18)
BUN SERPL-MCNC: 54 MG/DL (ref 7–18)
BZE UR QL SCN: NEGATIVE QUAL
CALCIUM SERPL-MCNC: 7.7 MG/DL (ref 8.5–10.1)
CALCIUM SERPL-MCNC: 7.7 MG/DL (ref 8.5–10.1)
CANNABINOIDS UR QL SCN: NEGATIVE QUAL
CHLORIDE SERPL-SCNC: 102 MMOL/L (ref 98–107)
CHLORIDE SERPL-SCNC: 98 MMOL/L (ref 98–107)
CK MB SERPL-MCNC: 11.1 U/L (ref 0–3.6)
CK MB SERPL-MCNC: 8.6 U/L (ref 0–3.6)
CK SERPL-CCNC: 1108 UL (ref 21–232)
CK SERPL-CCNC: 1665 UL (ref 21–232)
CO2 SERPL-SCNC: 23.4 MMOL/L (ref 21–32)
CO2 SERPL-SCNC: 24.8 MMOL/L (ref 21–32)
CREAT SERPL-MCNC: 3.5 MG/DL (ref 0.6–1.3)
CREAT SERPL-MCNC: 3.8 MG/DL (ref 0.6–1.3)
ERYTHROCYTE [DISTWIDTH] IN BLOOD BY AUTOMATED COUNT: 13.9 % (ref 11.5–14.5)
ETHANOL SERPL-MCNC: 1 MG/DL (ref 0–10)
GLOBULIN SER-MCNC: 3.7 G/L
GLUCOSE SERPL-MCNC: 200 MG/DL (ref 74–106)
GLUCOSE SERPL-MCNC: 205 MG/DL (ref 74–106)
HCT VFR BLD CALC: 43.3 % (ref 42–54)
HGB BLD-MCNC: 14.6 G/DL (ref 13.5–17.5)
INR PPP: 1.29 (ref 0.85–1.17)
KETONES UR STRIP-MCNC: NEGATIVE MG/DL
LIPASE SERPL-CCNC: 40 U/L (ref 73–393)
LYMPHOCYTES # BLD: 1.69 10X3/UL (ref 1.32–3.57)
LYMPHOCYTES NFR BLD AUTO: 6 % (ref 15–50)
MAGNESIUM SERPL-MCNC: 2.4 MG/DL (ref 1.8–2.4)
MCH RBC QN AUTO: 31.7 PG (ref 26–34)
MCHC RBC AUTO-ENTMCNC: 33.7 G/DL (ref 31–37)
MCV RBC: 93.9 FL (ref 80–100)
MONOCYTES NFR BLD: 8 % (ref 2–11)
NEUTROPHILS # BLD: 31.33 10X3/UL (ref 1.78–5.38)
NEUTROPHILS NFR BLD AUTO: 84 % (ref 40–80)
NITRITE UR-MCNC: NEGATIVE MG/ML
OPIATES UR QL SCN: POSITIVE QUAL
OSMOLALITY SERPL CALC.SUM OF ELEC: 281 MOSM/KG (ref 275–300)
OSMOLALITY SERPL CALC.SUM OF ELEC: 292 MOSM/KG (ref 275–300)
PCP UR QL SCN: NEGATIVE QUAL
PH UR STRIP: 5 [PH] (ref 5–8)
PLATELET # BLD EST: NORMAL 10*3/UL
PLATELET # BLD: 160 10X3/UL (ref 130–400)
PMV BLD AUTO: 8.7 FL (ref 7.4–10.4)
POTASSIUM SERPL-SCNC: 5.4 MMOL/L (ref 3.5–5.1)
POTASSIUM SERPL-SCNC: 6.3 MMOL/L (ref 3.5–5.1)
PROT SERPL-MCNC: 6.9 G/DL (ref 6.4–8.2)
PROTHROMBIN TIME: 14.9 SECONDS (ref 11.6–15)
RBC # BLD AUTO: 4.61 10X6/UL (ref 4.2–6.1)
SODIUM SERPL-SCNC: 131 MMOL/L (ref 136–145)
SODIUM SERPL-SCNC: 136 MMOL/L (ref 136–145)
TROPONIN I SERPL-MCNC: 0.08 NG/ML (ref 0–0.06)
UROBILINOGEN UR-MCNC: NORMAL MG/DL (ref ?–2)
WBC # BLD AUTO: 38.2 10X3/UL (ref 4.8–10.8)
WBC #/AREA URNS HPF: 1 HPF (ref 0–1)

## 2021-03-21 NOTE — NUR
CONSULT CALLED TO DR BLACKMAN AND RESULTS OF TROPONIN. REPORTED CMP TO DR POE.  ST. CARDIZEM X1 ORDERED IV.

## 2021-03-21 NOTE — NUR
FSBS 172; MD LOWERY NOTIFIED.  RECEIVED ORDER TO HOLD SLIDING SCALE INSULIN FOR
NOW. CHECK FSBS EVERY 6 HOURS AND NOTIFY IF >250.

## 2021-03-21 NOTE — NUR
1948- CARDIZEM ADMINISTERED AT FOR TACHYCARDIA @ 148.
1949- ATIVAN ADMINISTERED FOR AGITATION AND TREMORS.
1950-BP DECREASED WITH SYSTOLIC IN 80S. LEVOPHED INCREASE TO 5MCG/MIN
2011- NARCAN GTT DECREASED TO 1MCG
2036- NARCAN GTT STOPPED.

## 2021-03-21 NOTE — NUR
REC'D PT TO ICU. ALL MONITORING EQUIPMENT ATTACHED AND ALARMS SET. DR POE
AT BS. LEVOPHGED INFUSING AT 5MCG/MIN. NARCAN GTT AT 2.5MCG/KG/HR. NS .

## 2021-03-21 NOTE — NUR
RAMAZACON GIVEN. PT NOW ORIENTED X 3. ANSWERS APPROPRIATELY. WILL START TO
TITRATE NARCAN GTT TO OFF.

## 2021-03-21 NOTE — NUR
PT ARRIVED TO UNIT AT THIS TIME VIA BED ACCOMPANIED BY HOSPITAL STAFF. DR NAVARRETE HERE UPON PT ARRIVAL. PT CALM, ABLE TO FOLLOW COMMANDS, HAS AUDIBLE
CRACKLES. ON 15L NRB, SUCTIOING PROVIDED. DR NAVARRETE HAS PLACED ORDERS.

## 2021-03-22 VITALS — SYSTOLIC BLOOD PRESSURE: 94 MMHG | DIASTOLIC BLOOD PRESSURE: 66 MMHG

## 2021-03-22 VITALS — DIASTOLIC BLOOD PRESSURE: 57 MMHG | SYSTOLIC BLOOD PRESSURE: 97 MMHG

## 2021-03-22 VITALS — DIASTOLIC BLOOD PRESSURE: 58 MMHG | SYSTOLIC BLOOD PRESSURE: 91 MMHG

## 2021-03-22 VITALS — DIASTOLIC BLOOD PRESSURE: 55 MMHG | SYSTOLIC BLOOD PRESSURE: 103 MMHG

## 2021-03-22 VITALS — DIASTOLIC BLOOD PRESSURE: 83 MMHG | SYSTOLIC BLOOD PRESSURE: 102 MMHG

## 2021-03-22 VITALS — SYSTOLIC BLOOD PRESSURE: 92 MMHG | DIASTOLIC BLOOD PRESSURE: 56 MMHG

## 2021-03-22 VITALS — SYSTOLIC BLOOD PRESSURE: 102 MMHG | DIASTOLIC BLOOD PRESSURE: 83 MMHG

## 2021-03-22 VITALS — SYSTOLIC BLOOD PRESSURE: 97 MMHG | DIASTOLIC BLOOD PRESSURE: 59 MMHG

## 2021-03-22 VITALS — DIASTOLIC BLOOD PRESSURE: 62 MMHG | SYSTOLIC BLOOD PRESSURE: 83 MMHG

## 2021-03-22 VITALS — DIASTOLIC BLOOD PRESSURE: 79 MMHG | SYSTOLIC BLOOD PRESSURE: 125 MMHG

## 2021-03-22 VITALS — SYSTOLIC BLOOD PRESSURE: 102 MMHG | DIASTOLIC BLOOD PRESSURE: 64 MMHG

## 2021-03-22 VITALS — SYSTOLIC BLOOD PRESSURE: 85 MMHG | DIASTOLIC BLOOD PRESSURE: 59 MMHG

## 2021-03-22 VITALS — DIASTOLIC BLOOD PRESSURE: 60 MMHG | SYSTOLIC BLOOD PRESSURE: 93 MMHG

## 2021-03-22 VITALS — SYSTOLIC BLOOD PRESSURE: 83 MMHG | DIASTOLIC BLOOD PRESSURE: 52 MMHG

## 2021-03-22 VITALS — DIASTOLIC BLOOD PRESSURE: 52 MMHG | SYSTOLIC BLOOD PRESSURE: 1101 MMHG

## 2021-03-22 VITALS — SYSTOLIC BLOOD PRESSURE: 93 MMHG | DIASTOLIC BLOOD PRESSURE: 60 MMHG

## 2021-03-22 VITALS — DIASTOLIC BLOOD PRESSURE: 64 MMHG | SYSTOLIC BLOOD PRESSURE: 102 MMHG

## 2021-03-22 VITALS — SYSTOLIC BLOOD PRESSURE: 122 MMHG | DIASTOLIC BLOOD PRESSURE: 83 MMHG

## 2021-03-22 VITALS — DIASTOLIC BLOOD PRESSURE: 64 MMHG | SYSTOLIC BLOOD PRESSURE: 108 MMHG

## 2021-03-22 VITALS — DIASTOLIC BLOOD PRESSURE: 77 MMHG | SYSTOLIC BLOOD PRESSURE: 98 MMHG

## 2021-03-22 VITALS — DIASTOLIC BLOOD PRESSURE: 62 MMHG | SYSTOLIC BLOOD PRESSURE: 108 MMHG

## 2021-03-22 VITALS — DIASTOLIC BLOOD PRESSURE: 62 MMHG | SYSTOLIC BLOOD PRESSURE: 87 MMHG

## 2021-03-22 VITALS — SYSTOLIC BLOOD PRESSURE: 92 MMHG | DIASTOLIC BLOOD PRESSURE: 81 MMHG

## 2021-03-22 VITALS — DIASTOLIC BLOOD PRESSURE: 67 MMHG | SYSTOLIC BLOOD PRESSURE: 106 MMHG

## 2021-03-22 VITALS — SYSTOLIC BLOOD PRESSURE: 98 MMHG | DIASTOLIC BLOOD PRESSURE: 77 MMHG

## 2021-03-22 VITALS — DIASTOLIC BLOOD PRESSURE: 58 MMHG | SYSTOLIC BLOOD PRESSURE: 94 MMHG

## 2021-03-22 VITALS — DIASTOLIC BLOOD PRESSURE: 75 MMHG | SYSTOLIC BLOOD PRESSURE: 113 MMHG

## 2021-03-22 VITALS — DIASTOLIC BLOOD PRESSURE: 55 MMHG | SYSTOLIC BLOOD PRESSURE: 97 MMHG

## 2021-03-22 VITALS — DIASTOLIC BLOOD PRESSURE: 67 MMHG | SYSTOLIC BLOOD PRESSURE: 89 MMHG

## 2021-03-22 VITALS — SYSTOLIC BLOOD PRESSURE: 95 MMHG | DIASTOLIC BLOOD PRESSURE: 59 MMHG

## 2021-03-22 VITALS — SYSTOLIC BLOOD PRESSURE: 149 MMHG | DIASTOLIC BLOOD PRESSURE: 69 MMHG

## 2021-03-22 VITALS — SYSTOLIC BLOOD PRESSURE: 99 MMHG | DIASTOLIC BLOOD PRESSURE: 54 MMHG

## 2021-03-22 VITALS — SYSTOLIC BLOOD PRESSURE: 103 MMHG | DIASTOLIC BLOOD PRESSURE: 61 MMHG

## 2021-03-22 VITALS — SYSTOLIC BLOOD PRESSURE: 97 MMHG | DIASTOLIC BLOOD PRESSURE: 66 MMHG

## 2021-03-22 VITALS — SYSTOLIC BLOOD PRESSURE: 99 MMHG | DIASTOLIC BLOOD PRESSURE: 57 MMHG

## 2021-03-22 VITALS — DIASTOLIC BLOOD PRESSURE: 74 MMHG | SYSTOLIC BLOOD PRESSURE: 98 MMHG

## 2021-03-22 VITALS — SYSTOLIC BLOOD PRESSURE: 103 MMHG | DIASTOLIC BLOOD PRESSURE: 65 MMHG

## 2021-03-22 VITALS — DIASTOLIC BLOOD PRESSURE: 57 MMHG | SYSTOLIC BLOOD PRESSURE: 108 MMHG

## 2021-03-22 VITALS — SYSTOLIC BLOOD PRESSURE: 109 MMHG | DIASTOLIC BLOOD PRESSURE: 77 MMHG

## 2021-03-22 VITALS — DIASTOLIC BLOOD PRESSURE: 88 MMHG | SYSTOLIC BLOOD PRESSURE: 132 MMHG

## 2021-03-22 VITALS — SYSTOLIC BLOOD PRESSURE: 112 MMHG | DIASTOLIC BLOOD PRESSURE: 103 MMHG

## 2021-03-22 VITALS — DIASTOLIC BLOOD PRESSURE: 69 MMHG | SYSTOLIC BLOOD PRESSURE: 99 MMHG

## 2021-03-22 VITALS — DIASTOLIC BLOOD PRESSURE: 54 MMHG | SYSTOLIC BLOOD PRESSURE: 89 MMHG

## 2021-03-22 VITALS — SYSTOLIC BLOOD PRESSURE: 106 MMHG | DIASTOLIC BLOOD PRESSURE: 55 MMHG

## 2021-03-22 VITALS — SYSTOLIC BLOOD PRESSURE: 102 MMHG | DIASTOLIC BLOOD PRESSURE: 70 MMHG

## 2021-03-22 VITALS — SYSTOLIC BLOOD PRESSURE: 94 MMHG | DIASTOLIC BLOOD PRESSURE: 61 MMHG

## 2021-03-22 VITALS — DIASTOLIC BLOOD PRESSURE: 66 MMHG | SYSTOLIC BLOOD PRESSURE: 108 MMHG

## 2021-03-22 VITALS — SYSTOLIC BLOOD PRESSURE: 95 MMHG | DIASTOLIC BLOOD PRESSURE: 58 MMHG

## 2021-03-22 VITALS — DIASTOLIC BLOOD PRESSURE: 75 MMHG | SYSTOLIC BLOOD PRESSURE: 96 MMHG

## 2021-03-22 VITALS — DIASTOLIC BLOOD PRESSURE: 56 MMHG | SYSTOLIC BLOOD PRESSURE: 98 MMHG

## 2021-03-22 VITALS — SYSTOLIC BLOOD PRESSURE: 81 MMHG | DIASTOLIC BLOOD PRESSURE: 47 MMHG

## 2021-03-22 VITALS — DIASTOLIC BLOOD PRESSURE: 66 MMHG | SYSTOLIC BLOOD PRESSURE: 105 MMHG

## 2021-03-22 VITALS — DIASTOLIC BLOOD PRESSURE: 74 MMHG | SYSTOLIC BLOOD PRESSURE: 101 MMHG

## 2021-03-22 VITALS — DIASTOLIC BLOOD PRESSURE: 82 MMHG | SYSTOLIC BLOOD PRESSURE: 129 MMHG

## 2021-03-22 VITALS — SYSTOLIC BLOOD PRESSURE: 111 MMHG | DIASTOLIC BLOOD PRESSURE: 79 MMHG

## 2021-03-22 VITALS — DIASTOLIC BLOOD PRESSURE: 64 MMHG | SYSTOLIC BLOOD PRESSURE: 126 MMHG

## 2021-03-22 VITALS — SYSTOLIC BLOOD PRESSURE: 111 MMHG | DIASTOLIC BLOOD PRESSURE: 65 MMHG

## 2021-03-22 VITALS — DIASTOLIC BLOOD PRESSURE: 65 MMHG | SYSTOLIC BLOOD PRESSURE: 102 MMHG

## 2021-03-22 VITALS — SYSTOLIC BLOOD PRESSURE: 102 MMHG | DIASTOLIC BLOOD PRESSURE: 71 MMHG

## 2021-03-22 VITALS — DIASTOLIC BLOOD PRESSURE: 54 MMHG | SYSTOLIC BLOOD PRESSURE: 87 MMHG

## 2021-03-22 VITALS — SYSTOLIC BLOOD PRESSURE: 91 MMHG | DIASTOLIC BLOOD PRESSURE: 66 MMHG

## 2021-03-22 VITALS — DIASTOLIC BLOOD PRESSURE: 75 MMHG | SYSTOLIC BLOOD PRESSURE: 104 MMHG

## 2021-03-22 VITALS — SYSTOLIC BLOOD PRESSURE: 120 MMHG | DIASTOLIC BLOOD PRESSURE: 103 MMHG

## 2021-03-22 VITALS — SYSTOLIC BLOOD PRESSURE: 89 MMHG | DIASTOLIC BLOOD PRESSURE: 68 MMHG

## 2021-03-22 VITALS — DIASTOLIC BLOOD PRESSURE: 59 MMHG | SYSTOLIC BLOOD PRESSURE: 86 MMHG

## 2021-03-22 VITALS — SYSTOLIC BLOOD PRESSURE: 108 MMHG | DIASTOLIC BLOOD PRESSURE: 64 MMHG

## 2021-03-22 VITALS — DIASTOLIC BLOOD PRESSURE: 68 MMHG | SYSTOLIC BLOOD PRESSURE: 89 MMHG

## 2021-03-22 VITALS — SYSTOLIC BLOOD PRESSURE: 110 MMHG | DIASTOLIC BLOOD PRESSURE: 74 MMHG

## 2021-03-22 VITALS — DIASTOLIC BLOOD PRESSURE: 65 MMHG | SYSTOLIC BLOOD PRESSURE: 106 MMHG

## 2021-03-22 VITALS — SYSTOLIC BLOOD PRESSURE: 87 MMHG | DIASTOLIC BLOOD PRESSURE: 59 MMHG

## 2021-03-22 VITALS — DIASTOLIC BLOOD PRESSURE: 57 MMHG | SYSTOLIC BLOOD PRESSURE: 89 MMHG

## 2021-03-22 VITALS — SYSTOLIC BLOOD PRESSURE: 98 MMHG | DIASTOLIC BLOOD PRESSURE: 74 MMHG

## 2021-03-22 LAB
ALBUMIN SERPL-MCNC: 2.5 G/DL (ref 3.4–5)
ALP SERPL-CCNC: 92 U/L (ref 30–120)
ALT SERPL-CCNC: 42 U/L (ref 10–68)
ANION GAP SERPL CALC-SCNC: 19.4 MMOL/L (ref 8–16)
BILIRUB SERPL-MCNC: 1.03 MG/DL (ref 0.2–1.3)
BUN SERPL-MCNC: 58 MG/DL (ref 7–18)
CALCIUM SERPL-MCNC: 7.5 MG/DL (ref 8.5–10.1)
CHLORIDE SERPL-SCNC: 102 MMOL/L (ref 98–107)
CK MB SERPL-MCNC: 4.6 U/L (ref 0–3.6)
CK SERPL-CCNC: 1317 UL (ref 21–232)
CO2 SERPL-SCNC: 19.3 MMOL/L (ref 21–32)
CREAT SERPL-MCNC: 3.4 MG/DL (ref 0.6–1.3)
EOSINOPHIL NFR BLD: 1 % (ref 0–7)
ERYTHROCYTE [DISTWIDTH] IN BLOOD BY AUTOMATED COUNT: 14.5 % (ref 11.5–14.5)
FUNGUS MYCOLOGY CULTURE: (no result)
GLOBULIN SER-MCNC: 3.3 G/L
GLUCOSE SERPL-MCNC: 212 MG/DL (ref 74–106)
HCT VFR BLD CALC: 39.3 % (ref 42–54)
HGB BLD-MCNC: 13 G/DL (ref 13.5–17.5)
LYMPHOCYTES # BLD: 1.99 10X3/UL (ref 1.32–3.57)
LYMPHOCYTES NFR BLD AUTO: 10 % (ref 15–50)
MCH RBC QN AUTO: 31.3 PG (ref 26–34)
MCHC RBC AUTO-ENTMCNC: 33.1 G/DL (ref 31–37)
MCV RBC: 94.5 FL (ref 80–100)
MONOCYTES NFR BLD: 8 % (ref 2–11)
NEUTROPHILS # BLD: 29 10X3/UL (ref 1.78–5.38)
NEUTROPHILS NFR BLD AUTO: 77 % (ref 40–80)
OSMOLALITY SERPL CALC.SUM OF ELEC: 291 MOSM/KG (ref 275–300)
PLAT MORPH BLD: (no result)
PLATELET # BLD EST: NORMAL 10*3/UL
PLATELET # BLD: 168 10X3/UL (ref 130–400)
PMV BLD AUTO: 9.1 FL (ref 7.4–10.4)
POTASSIUM SERPL-SCNC: 5.7 MMOL/L (ref 3.5–5.1)
PROT SERPL-MCNC: 5.8 G/DL (ref 6.4–8.2)
RBC # BLD AUTO: 4.16 10X6/UL (ref 4.2–6.1)
SODIUM SERPL-SCNC: 135 MMOL/L (ref 136–145)
TROPONIN I SERPL-MCNC: 0.11 NG/ML (ref 0–0.06)
VANCOMYCIN SERPL-MCNC: 8.3 UG/ML (ref 10–20)
WBC # BLD AUTO: 33.8 10X3/UL (ref 4.8–10.8)

## 2021-03-22 NOTE — NUR
PTS TEMP 102.3. ICE PACKS APPLIED TO BILAT GROIN AND AXILLARY, BLANKETS
REMOVED AND AIR TURNED ON. WILL CTM

## 2021-03-22 NOTE — NUR
2326- INCREASED LEVO TO 8
2331- INCREASED LEVO TO 9
2346- INCREASED LEVO TO 10
2350- INCREASED LEVO TO 11
0017- INCREASED LEVO TO 12
0050- INCREASED LEVO TO 13

## 2021-03-22 NOTE — NUR
SPOKE WITH DR PENA ABOUT LAB RESULTS AND PATIENT CONDITION. WILL SEE
PATIENT THIS AM, NO NEW ORDERS. DARIEN JONES RN

## 2021-03-23 VITALS — SYSTOLIC BLOOD PRESSURE: 112 MMHG | DIASTOLIC BLOOD PRESSURE: 72 MMHG

## 2021-03-23 VITALS — DIASTOLIC BLOOD PRESSURE: 82 MMHG | SYSTOLIC BLOOD PRESSURE: 139 MMHG

## 2021-03-23 VITALS — SYSTOLIC BLOOD PRESSURE: 117 MMHG | DIASTOLIC BLOOD PRESSURE: 78 MMHG

## 2021-03-23 VITALS — DIASTOLIC BLOOD PRESSURE: 59 MMHG | SYSTOLIC BLOOD PRESSURE: 108 MMHG

## 2021-03-23 VITALS — SYSTOLIC BLOOD PRESSURE: 120 MMHG | DIASTOLIC BLOOD PRESSURE: 75 MMHG

## 2021-03-23 VITALS — DIASTOLIC BLOOD PRESSURE: 70 MMHG | SYSTOLIC BLOOD PRESSURE: 116 MMHG

## 2021-03-23 VITALS — DIASTOLIC BLOOD PRESSURE: 72 MMHG | SYSTOLIC BLOOD PRESSURE: 112 MMHG

## 2021-03-23 VITALS — SYSTOLIC BLOOD PRESSURE: 139 MMHG | DIASTOLIC BLOOD PRESSURE: 72 MMHG

## 2021-03-23 VITALS — SYSTOLIC BLOOD PRESSURE: 123 MMHG | DIASTOLIC BLOOD PRESSURE: 86 MMHG

## 2021-03-23 VITALS — DIASTOLIC BLOOD PRESSURE: 68 MMHG | SYSTOLIC BLOOD PRESSURE: 133 MMHG

## 2021-03-23 VITALS — SYSTOLIC BLOOD PRESSURE: 139 MMHG | DIASTOLIC BLOOD PRESSURE: 97 MMHG

## 2021-03-23 VITALS — DIASTOLIC BLOOD PRESSURE: 89 MMHG | SYSTOLIC BLOOD PRESSURE: 144 MMHG

## 2021-03-23 VITALS — DIASTOLIC BLOOD PRESSURE: 82 MMHG | SYSTOLIC BLOOD PRESSURE: 130 MMHG

## 2021-03-23 VITALS — SYSTOLIC BLOOD PRESSURE: 144 MMHG | DIASTOLIC BLOOD PRESSURE: 98 MMHG

## 2021-03-23 VITALS — SYSTOLIC BLOOD PRESSURE: 144 MMHG | DIASTOLIC BLOOD PRESSURE: 89 MMHG

## 2021-03-23 VITALS — SYSTOLIC BLOOD PRESSURE: 126 MMHG | DIASTOLIC BLOOD PRESSURE: 74 MMHG

## 2021-03-23 VITALS — SYSTOLIC BLOOD PRESSURE: 118 MMHG | DIASTOLIC BLOOD PRESSURE: 91 MMHG

## 2021-03-23 VITALS — DIASTOLIC BLOOD PRESSURE: 81 MMHG | SYSTOLIC BLOOD PRESSURE: 136 MMHG

## 2021-03-23 VITALS — DIASTOLIC BLOOD PRESSURE: 60 MMHG | SYSTOLIC BLOOD PRESSURE: 116 MMHG

## 2021-03-23 VITALS — DIASTOLIC BLOOD PRESSURE: 118 MMHG | SYSTOLIC BLOOD PRESSURE: 182 MMHG

## 2021-03-23 VITALS — DIASTOLIC BLOOD PRESSURE: 75 MMHG | SYSTOLIC BLOOD PRESSURE: 143 MMHG

## 2021-03-23 VITALS — DIASTOLIC BLOOD PRESSURE: 77 MMHG | SYSTOLIC BLOOD PRESSURE: 138 MMHG

## 2021-03-23 VITALS — DIASTOLIC BLOOD PRESSURE: 80 MMHG | SYSTOLIC BLOOD PRESSURE: 142 MMHG

## 2021-03-23 VITALS — DIASTOLIC BLOOD PRESSURE: 79 MMHG | SYSTOLIC BLOOD PRESSURE: 135 MMHG

## 2021-03-23 VITALS — SYSTOLIC BLOOD PRESSURE: 108 MMHG | DIASTOLIC BLOOD PRESSURE: 59 MMHG

## 2021-03-23 VITALS — DIASTOLIC BLOOD PRESSURE: 89 MMHG | SYSTOLIC BLOOD PRESSURE: 133 MMHG

## 2021-03-23 VITALS — SYSTOLIC BLOOD PRESSURE: 111 MMHG | DIASTOLIC BLOOD PRESSURE: 97 MMHG

## 2021-03-23 VITALS — DIASTOLIC BLOOD PRESSURE: 88 MMHG | SYSTOLIC BLOOD PRESSURE: 132 MMHG

## 2021-03-23 VITALS — DIASTOLIC BLOOD PRESSURE: 71 MMHG | SYSTOLIC BLOOD PRESSURE: 111 MMHG

## 2021-03-23 VITALS — SYSTOLIC BLOOD PRESSURE: 130 MMHG | DIASTOLIC BLOOD PRESSURE: 75 MMHG

## 2021-03-23 VITALS — SYSTOLIC BLOOD PRESSURE: 116 MMHG | DIASTOLIC BLOOD PRESSURE: 91 MMHG

## 2021-03-23 VITALS — SYSTOLIC BLOOD PRESSURE: 163 MMHG | DIASTOLIC BLOOD PRESSURE: 114 MMHG

## 2021-03-23 VITALS — SYSTOLIC BLOOD PRESSURE: 140 MMHG | DIASTOLIC BLOOD PRESSURE: 86 MMHG

## 2021-03-23 VITALS — SYSTOLIC BLOOD PRESSURE: 173 MMHG | DIASTOLIC BLOOD PRESSURE: 100 MMHG

## 2021-03-23 VITALS — DIASTOLIC BLOOD PRESSURE: 77 MMHG | SYSTOLIC BLOOD PRESSURE: 117 MMHG

## 2021-03-23 VITALS — DIASTOLIC BLOOD PRESSURE: 98 MMHG | SYSTOLIC BLOOD PRESSURE: 144 MMHG

## 2021-03-23 VITALS — SYSTOLIC BLOOD PRESSURE: 96 MMHG | DIASTOLIC BLOOD PRESSURE: 50 MMHG

## 2021-03-23 VITALS — SYSTOLIC BLOOD PRESSURE: 94 MMHG | DIASTOLIC BLOOD PRESSURE: 73 MMHG

## 2021-03-23 VITALS — SYSTOLIC BLOOD PRESSURE: 118 MMHG | DIASTOLIC BLOOD PRESSURE: 80 MMHG

## 2021-03-23 VITALS — DIASTOLIC BLOOD PRESSURE: 97 MMHG | SYSTOLIC BLOOD PRESSURE: 111 MMHG

## 2021-03-23 VITALS — SYSTOLIC BLOOD PRESSURE: 148 MMHG | DIASTOLIC BLOOD PRESSURE: 88 MMHG

## 2021-03-23 VITALS — SYSTOLIC BLOOD PRESSURE: 116 MMHG | DIASTOLIC BLOOD PRESSURE: 60 MMHG

## 2021-03-23 VITALS — SYSTOLIC BLOOD PRESSURE: 175 MMHG | DIASTOLIC BLOOD PRESSURE: 98 MMHG

## 2021-03-23 VITALS — DIASTOLIC BLOOD PRESSURE: 64 MMHG | SYSTOLIC BLOOD PRESSURE: 108 MMHG

## 2021-03-23 VITALS — DIASTOLIC BLOOD PRESSURE: 94 MMHG | SYSTOLIC BLOOD PRESSURE: 137 MMHG

## 2021-03-23 VITALS — DIASTOLIC BLOOD PRESSURE: 75 MMHG | SYSTOLIC BLOOD PRESSURE: 174 MMHG

## 2021-03-23 VITALS — SYSTOLIC BLOOD PRESSURE: 141 MMHG | DIASTOLIC BLOOD PRESSURE: 80 MMHG

## 2021-03-23 VITALS — SYSTOLIC BLOOD PRESSURE: 130 MMHG | DIASTOLIC BLOOD PRESSURE: 81 MMHG

## 2021-03-23 VITALS — DIASTOLIC BLOOD PRESSURE: 89 MMHG | SYSTOLIC BLOOD PRESSURE: 126 MMHG

## 2021-03-23 VITALS — DIASTOLIC BLOOD PRESSURE: 137 MMHG | SYSTOLIC BLOOD PRESSURE: 170 MMHG

## 2021-03-23 VITALS — DIASTOLIC BLOOD PRESSURE: 88 MMHG | SYSTOLIC BLOOD PRESSURE: 151 MMHG

## 2021-03-23 VITALS — DIASTOLIC BLOOD PRESSURE: 72 MMHG | SYSTOLIC BLOOD PRESSURE: 139 MMHG

## 2021-03-23 VITALS — SYSTOLIC BLOOD PRESSURE: 149 MMHG | DIASTOLIC BLOOD PRESSURE: 71 MMHG

## 2021-03-23 VITALS — DIASTOLIC BLOOD PRESSURE: 82 MMHG | SYSTOLIC BLOOD PRESSURE: 134 MMHG

## 2021-03-23 VITALS — SYSTOLIC BLOOD PRESSURE: 184 MMHG | DIASTOLIC BLOOD PRESSURE: 75 MMHG

## 2021-03-23 VITALS — SYSTOLIC BLOOD PRESSURE: 135 MMHG | DIASTOLIC BLOOD PRESSURE: 89 MMHG

## 2021-03-23 VITALS — SYSTOLIC BLOOD PRESSURE: 93 MMHG | DIASTOLIC BLOOD PRESSURE: 55 MMHG

## 2021-03-23 VITALS — SYSTOLIC BLOOD PRESSURE: 158 MMHG | DIASTOLIC BLOOD PRESSURE: 69 MMHG

## 2021-03-23 VITALS — DIASTOLIC BLOOD PRESSURE: 71 MMHG | SYSTOLIC BLOOD PRESSURE: 149 MMHG

## 2021-03-23 VITALS — DIASTOLIC BLOOD PRESSURE: 71 MMHG | SYSTOLIC BLOOD PRESSURE: 141 MMHG

## 2021-03-23 VITALS — DIASTOLIC BLOOD PRESSURE: 68 MMHG | SYSTOLIC BLOOD PRESSURE: 112 MMHG

## 2021-03-23 LAB
ALBUMIN SERPL-MCNC: 2 G/DL (ref 3.4–5)
ALP SERPL-CCNC: 87 U/L (ref 30–120)
ALT SERPL-CCNC: 47 U/L (ref 10–68)
AMYLASE SERPL-CCNC: 38 U/L (ref 25–115)
ANION GAP SERPL CALC-SCNC: 21.1 MMOL/L (ref 8–16)
BILIRUB SERPL-MCNC: 0.37 MG/DL (ref 0.2–1.3)
BUN SERPL-MCNC: 74 MG/DL (ref 7–18)
CALCIUM SERPL-MCNC: 7.5 MG/DL (ref 8.5–10.1)
CHLORIDE SERPL-SCNC: 102 MMOL/L (ref 98–107)
CK MB SERPL-MCNC: 7.4 U/L (ref 0–3.6)
CK SERPL-CCNC: 2168 UL (ref 21–232)
CO2 SERPL-SCNC: 18 MMOL/L (ref 21–32)
CREAT SERPL-MCNC: 5.7 MG/DL (ref 0.6–1.3)
ERYTHROCYTE [DISTWIDTH] IN BLOOD BY AUTOMATED COUNT: 14 % (ref 11.5–14.5)
FUNGUS SPEC CULT: (no result)
GLOBULIN SER-MCNC: 3.3 G/L
GLUCOSE SERPL-MCNC: 263 MG/DL (ref 74–106)
HCT VFR BLD CALC: 31.5 % (ref 42–54)
HGB BLD-MCNC: 10.5 G/DL (ref 13.5–17.5)
LIPASE SERPL-CCNC: 56 U/L (ref 73–393)
LYMPHOCYTES # BLD: 0.42 10X3/UL (ref 1.32–3.57)
LYMPHOCYTES NFR BLD AUTO: 3 % (ref 15–50)
MCH RBC QN AUTO: 31.3 PG (ref 26–34)
MCHC RBC AUTO-ENTMCNC: 33.3 G/DL (ref 31–37)
MCV RBC: 93.8 FL (ref 80–100)
MONOCYTES NFR BLD: 3 % (ref 2–11)
NEUTROPHILS # BLD: 19.47 10X3/UL (ref 1.78–5.38)
NEUTROPHILS NFR BLD AUTO: 79 % (ref 40–80)
OSMOLALITY SERPL CALC.SUM OF ELEC: 302 MOSM/KG (ref 275–300)
PLAT MORPH BLD: (no result)
PLATELET # BLD EST: NORMAL 10*3/UL
PLATELET # BLD: 143 10X3/UL (ref 130–400)
PMV BLD AUTO: 8.6 FL (ref 7.4–10.4)
POTASSIUM SERPL-SCNC: 5.1 MMOL/L (ref 3.5–5.1)
PROT SERPL-MCNC: 5.3 G/DL (ref 6.4–8.2)
RBC # BLD AUTO: 3.36 10X6/UL (ref 4.2–6.1)
SODIUM SERPL-SCNC: 136 MMOL/L (ref 136–145)
VANCOMYCIN SERPL-MCNC: 17 UG/ML (ref 10–20)
WBC # BLD AUTO: 21.6 10X3/UL (ref 4.8–10.8)

## 2021-03-23 NOTE — NUR
CALLED DR. GREENE WITH INCREASE IN CREATININE. HE SAYS HE WILL LOOK AT THE
REST OF THE LABS AND BE UP HERE SHORTLY.

## 2021-03-23 NOTE — NUR
WITH TREMORS IN ARMS, GARBLED SPEECH, TACHY 110S, AND FEVER 100.6. TYLENOL
GIVEN FOR FEVER AND ATIVAN GIVEN FOR DTS. IS SLEEPING NOW, EASILY AROUSABLE,
VSS.

## 2021-03-24 VITALS — SYSTOLIC BLOOD PRESSURE: 116 MMHG | DIASTOLIC BLOOD PRESSURE: 33 MMHG

## 2021-03-24 VITALS — DIASTOLIC BLOOD PRESSURE: 83 MMHG | SYSTOLIC BLOOD PRESSURE: 119 MMHG

## 2021-03-24 VITALS — SYSTOLIC BLOOD PRESSURE: 142 MMHG | DIASTOLIC BLOOD PRESSURE: 54 MMHG

## 2021-03-24 VITALS — SYSTOLIC BLOOD PRESSURE: 139 MMHG | DIASTOLIC BLOOD PRESSURE: 63 MMHG

## 2021-03-24 VITALS — SYSTOLIC BLOOD PRESSURE: 143 MMHG | DIASTOLIC BLOOD PRESSURE: 71 MMHG

## 2021-03-24 VITALS — SYSTOLIC BLOOD PRESSURE: 117 MMHG | DIASTOLIC BLOOD PRESSURE: 73 MMHG

## 2021-03-24 VITALS — DIASTOLIC BLOOD PRESSURE: 76 MMHG | SYSTOLIC BLOOD PRESSURE: 121 MMHG

## 2021-03-24 VITALS — DIASTOLIC BLOOD PRESSURE: 80 MMHG | SYSTOLIC BLOOD PRESSURE: 117 MMHG

## 2021-03-24 VITALS — SYSTOLIC BLOOD PRESSURE: 149 MMHG | DIASTOLIC BLOOD PRESSURE: 86 MMHG

## 2021-03-24 VITALS — SYSTOLIC BLOOD PRESSURE: 152 MMHG | DIASTOLIC BLOOD PRESSURE: 70 MMHG

## 2021-03-24 VITALS — SYSTOLIC BLOOD PRESSURE: 124 MMHG | DIASTOLIC BLOOD PRESSURE: 78 MMHG

## 2021-03-24 VITALS — DIASTOLIC BLOOD PRESSURE: 76 MMHG | SYSTOLIC BLOOD PRESSURE: 125 MMHG

## 2021-03-24 VITALS — SYSTOLIC BLOOD PRESSURE: 133 MMHG | DIASTOLIC BLOOD PRESSURE: 64 MMHG

## 2021-03-24 VITALS — SYSTOLIC BLOOD PRESSURE: 136 MMHG | DIASTOLIC BLOOD PRESSURE: 72 MMHG

## 2021-03-24 VITALS — SYSTOLIC BLOOD PRESSURE: 129 MMHG | DIASTOLIC BLOOD PRESSURE: 51 MMHG

## 2021-03-24 VITALS — SYSTOLIC BLOOD PRESSURE: 119 MMHG | DIASTOLIC BLOOD PRESSURE: 75 MMHG

## 2021-03-24 VITALS — SYSTOLIC BLOOD PRESSURE: 112 MMHG | DIASTOLIC BLOOD PRESSURE: 74 MMHG

## 2021-03-24 VITALS — DIASTOLIC BLOOD PRESSURE: 78 MMHG | SYSTOLIC BLOOD PRESSURE: 121 MMHG

## 2021-03-24 VITALS — SYSTOLIC BLOOD PRESSURE: 113 MMHG | DIASTOLIC BLOOD PRESSURE: 72 MMHG

## 2021-03-24 VITALS — DIASTOLIC BLOOD PRESSURE: 81 MMHG | SYSTOLIC BLOOD PRESSURE: 128 MMHG

## 2021-03-24 VITALS — SYSTOLIC BLOOD PRESSURE: 123 MMHG | DIASTOLIC BLOOD PRESSURE: 88 MMHG

## 2021-03-24 VITALS — SYSTOLIC BLOOD PRESSURE: 118 MMHG | DIASTOLIC BLOOD PRESSURE: 83 MMHG

## 2021-03-24 VITALS — DIASTOLIC BLOOD PRESSURE: 76 MMHG | SYSTOLIC BLOOD PRESSURE: 117 MMHG

## 2021-03-24 VITALS — DIASTOLIC BLOOD PRESSURE: 77 MMHG | SYSTOLIC BLOOD PRESSURE: 115 MMHG

## 2021-03-24 VITALS — DIASTOLIC BLOOD PRESSURE: 81 MMHG | SYSTOLIC BLOOD PRESSURE: 136 MMHG

## 2021-03-24 VITALS — SYSTOLIC BLOOD PRESSURE: 128 MMHG | DIASTOLIC BLOOD PRESSURE: 82 MMHG

## 2021-03-24 LAB
ANION GAP SERPL CALC-SCNC: 17.8 MMOL/L (ref 8–16)
ANION GAP SERPL CALC-SCNC: 19.2 MMOL/L (ref 8–16)
BASOPHILS NFR BLD AUTO: 0 % (ref 0–2)
BUN SERPL-MCNC: 81 MG/DL (ref 7–18)
BUN SERPL-MCNC: 86 MG/DL (ref 7–18)
CALCIUM SERPL-MCNC: 7.6 MG/DL (ref 8.5–10.1)
CALCIUM SERPL-MCNC: 8.1 MG/DL (ref 8.5–10.1)
CHLORIDE SERPL-SCNC: 101 MMOL/L (ref 98–107)
CHLORIDE SERPL-SCNC: 103 MMOL/L (ref 98–107)
CO2 SERPL-SCNC: 20.1 MMOL/L (ref 21–32)
CO2 SERPL-SCNC: 20.6 MMOL/L (ref 21–32)
CREAT SERPL-MCNC: 7.4 MG/DL (ref 0.6–1.3)
CREAT SERPL-MCNC: 7.8 MG/DL (ref 0.6–1.3)
EOSINOPHIL NFR BLD: 0 % (ref 0–7)
ERYTHROCYTE [DISTWIDTH] IN BLOOD BY AUTOMATED COUNT: 14.4 % (ref 11.5–14.5)
GLUCOSE SERPL-MCNC: 131 MG/DL (ref 74–106)
GLUCOSE SERPL-MCNC: 184 MG/DL (ref 74–106)
HCT VFR BLD CALC: 33.4 % (ref 42–54)
HGB BLD-MCNC: 10.9 G/DL (ref 13.5–17.5)
IMM GRANULOCYTES NFR BLD: 0.7 % (ref 0–5)
LYMPHOCYTES # BLD: 0.93 10X3/UL (ref 1.32–3.57)
LYMPHOCYTES NFR BLD AUTO: 4.4 % (ref 15–50)
MCH RBC QN AUTO: 31.7 PG (ref 26–34)
MCHC RBC AUTO-ENTMCNC: 32.6 G/DL (ref 31–37)
MCV RBC: 97.1 FL (ref 80–100)
MONOCYTES NFR BLD: 5.7 % (ref 2–11)
NEUTROPHILS # BLD: 18.91 10X3/UL (ref 1.78–5.38)
NEUTROPHILS NFR BLD AUTO: 89.2 % (ref 40–80)
OSMOLALITY SERPL CALC.SUM OF ELEC: 296 MOSM/KG (ref 275–300)
OSMOLALITY SERPL CALC.SUM OF ELEC: 297 MOSM/KG (ref 275–300)
PHOSPHATE SERPL-MCNC: 11.3 MG/DL (ref 2.5–4.9)
PLATELET # BLD: 165 10X3/UL (ref 130–400)
PMV BLD AUTO: 8.9 FL (ref 7.4–10.4)
POTASSIUM SERPL-SCNC: 6.3 MMOL/L (ref 3.5–5.1)
POTASSIUM SERPL-SCNC: 6.4 MMOL/L (ref 3.5–5.1)
RBC # BLD AUTO: 3.44 10X6/UL (ref 4.2–6.1)
SODIUM SERPL-SCNC: 134 MMOL/L (ref 136–145)
SODIUM SERPL-SCNC: 135 MMOL/L (ref 136–145)
VANCOMYCIN SERPL-MCNC: 17.8 UG/ML (ref 10–20)
WBC # BLD AUTO: 21.2 10X3/UL (ref 4.8–10.8)

## 2021-03-24 PROCEDURE — 05H633Z INSERTION OF INFUSION DEVICE INTO LEFT SUBCLAVIAN VEIN, PERCUTANEOUS APPROACH: ICD-10-PCS | Performed by: SURGERY

## 2021-03-24 NOTE — NUR
Pt resting in bed with eyes closed, no distress noted. Pt has had his HD cath
placed and is now receiving dialysis, no acute distress noted.

## 2021-03-24 NOTE — NUR
Nutrition follow-up:
Pt discussed during IDT team rounds
Pt s/p trialysis cath placed for possible diaylsis 2/2 elevated K
NPO/clear liquids at this time
pt continues to be lethargic per nurse and RD observation
Labs reviewed
Wt: 249#
Now with DT's
+BM
Will need nutrition support started within 24-48 hours if po intake continues
to be poor due to lethargy.
RDN follow-up: 3/25/21

## 2021-03-24 NOTE — EC
PATIENT:JASON HIGGINS               DATE OF SERVICE: 03/21/21
SEX: M                                  MEDICAL RECORD: [id number]
DATE OF BIRTH: [birth year]                        LOCATION:[facility]     D231
AGE OF PATIENT: 50                             ADMISSION DATE: 03/21/21
 
REFERRING PHYSICIAN:                               
 
INTERPRETING PHYSICIAN: CATHI KING MD          
 
 
 
                             ECHOCARDIOGRAM REPORT
  ECHO CHARGES 5               ECHO LIMITED                  Date: 03/22/21
 
 
 
CLINICAL DIAGNOSIS: ELEVATED TROP                 
 
                         ECHOCARDIOGRAPHIC MEASUREMENTS
      (adult normal given)
   AC root (d.<3.7cm) 0    cm   LV Septum d (<1.2 cm> 0    cm
      Valve Excursion 0    cm     LV Septum (systole) 0    cm
Left Atria (s.<4.0cm> 0    cm          LVPW d(<1.2cm) 0    cm
        RV (d.<2.3cm) 0    cm           LVPW (sytole) 0    cm
  LV diastole(<5.6CM) 0    cm       MV E-F(>70mm/sec) 0    cm
           LV systole 0    cm           LVOT Diameter 0    cm
       MV exc.(>10mm) 0    cm
Est.ejection fraction (50-75%) 0   %
 
   DOPPLER:
     LVIT      cm/sec A 0    cm/sec E 0     cm/sec
       LA 0    cm/sec      RVSP 0    mmHg
     LVOT 0    cm/sec   AOP1/2T 0    m/s
  Asc. Ao 0    cm/sec
     RVOT 0    cm/sec
       RA 0    cm/sec
       PA 0    cm/sec
 AV Gradient Peak 0    mmHg  AV Mean 0    mmHg  AV Area 0    cm
 MV Gradient Peak 0    mmHg  MV Mean 0    mmHg  MV Area 0    cm
   COMMENTS:                                              
 
 
 Cardiac Sonographer: Delonte               MICHELLE DOWNS             
      Cardiologist: 3          Dr. Monroy             
             TAPE#                
                                       Pericardial Effusion N                        
 
 
DATE OF SERVICE:  
 
Adequate 2D, color-flow imaging, spectral Doppler, and M-Mode
 
FINDINGS:  Grossly, no LVH.  Left ventricular internal dimensions are normal. 
Wall motion is normal.  EF is greater than or equal to 55%.  Aortic valve is
tricuspid.  No evidence of stenosis by Doppler interrogation.  Left atrium is
grossly normal.  Mitral valve shows no prolapse.  Trivial MR.  Right side is
grossly normal.  Trivial TR.
 
 
 
 
ECHOCARDIOGRAM REPORT                          [id number]    JASON HIGGINS       
 
 
TRANSINT:[id number] Voice Confirmation ID: [id number] DOCUMENT ID: [id number]
                                           
                                           CATHI KING MD          
 
 
 
Electronically Signed by CATHI KING on 03/24/21 at 1001
 
 
 
 
 
 
 
 
 
 
 
 
 
 
 
 
 
 
 
 
 
 
 
 
 
 
 
 
 
 
 
 
 
 
 
 
 
 
 
CC:                                                             [id number]
DICTATION DATE: 03/23/21 0810     :     03/23/21 0843      ADM IN  
                                                                              
[facility]                                          
[address] [facility]

## 2021-03-25 VITALS — SYSTOLIC BLOOD PRESSURE: 132 MMHG | DIASTOLIC BLOOD PRESSURE: 82 MMHG

## 2021-03-25 VITALS — SYSTOLIC BLOOD PRESSURE: 142 MMHG | DIASTOLIC BLOOD PRESSURE: 96 MMHG

## 2021-03-25 VITALS — DIASTOLIC BLOOD PRESSURE: 90 MMHG | SYSTOLIC BLOOD PRESSURE: 142 MMHG

## 2021-03-25 VITALS — DIASTOLIC BLOOD PRESSURE: 88 MMHG | SYSTOLIC BLOOD PRESSURE: 145 MMHG

## 2021-03-25 VITALS — SYSTOLIC BLOOD PRESSURE: 131 MMHG | DIASTOLIC BLOOD PRESSURE: 62 MMHG

## 2021-03-25 VITALS — SYSTOLIC BLOOD PRESSURE: 191 MMHG | DIASTOLIC BLOOD PRESSURE: 91 MMHG

## 2021-03-25 VITALS — SYSTOLIC BLOOD PRESSURE: 142 MMHG | DIASTOLIC BLOOD PRESSURE: 84 MMHG

## 2021-03-25 VITALS — SYSTOLIC BLOOD PRESSURE: 137 MMHG | DIASTOLIC BLOOD PRESSURE: 86 MMHG

## 2021-03-25 VITALS — SYSTOLIC BLOOD PRESSURE: 141 MMHG | DIASTOLIC BLOOD PRESSURE: 85 MMHG

## 2021-03-25 VITALS — SYSTOLIC BLOOD PRESSURE: 148 MMHG | DIASTOLIC BLOOD PRESSURE: 86 MMHG

## 2021-03-25 VITALS — DIASTOLIC BLOOD PRESSURE: 89 MMHG | SYSTOLIC BLOOD PRESSURE: 139 MMHG

## 2021-03-25 VITALS — SYSTOLIC BLOOD PRESSURE: 148 MMHG | DIASTOLIC BLOOD PRESSURE: 90 MMHG

## 2021-03-25 VITALS — SYSTOLIC BLOOD PRESSURE: 152 MMHG | DIASTOLIC BLOOD PRESSURE: 88 MMHG

## 2021-03-25 VITALS — SYSTOLIC BLOOD PRESSURE: 148 MMHG | DIASTOLIC BLOOD PRESSURE: 88 MMHG

## 2021-03-25 VITALS — SYSTOLIC BLOOD PRESSURE: 137 MMHG | DIASTOLIC BLOOD PRESSURE: 94 MMHG

## 2021-03-25 VITALS — SYSTOLIC BLOOD PRESSURE: 149 MMHG | DIASTOLIC BLOOD PRESSURE: 91 MMHG

## 2021-03-25 VITALS — SYSTOLIC BLOOD PRESSURE: 144 MMHG | DIASTOLIC BLOOD PRESSURE: 85 MMHG

## 2021-03-25 VITALS — SYSTOLIC BLOOD PRESSURE: 147 MMHG | DIASTOLIC BLOOD PRESSURE: 87 MMHG

## 2021-03-25 VITALS — DIASTOLIC BLOOD PRESSURE: 86 MMHG | SYSTOLIC BLOOD PRESSURE: 145 MMHG

## 2021-03-25 VITALS — SYSTOLIC BLOOD PRESSURE: 151 MMHG | DIASTOLIC BLOOD PRESSURE: 80 MMHG

## 2021-03-25 VITALS — DIASTOLIC BLOOD PRESSURE: 105 MMHG | SYSTOLIC BLOOD PRESSURE: 187 MMHG

## 2021-03-25 VITALS — DIASTOLIC BLOOD PRESSURE: 85 MMHG | SYSTOLIC BLOOD PRESSURE: 143 MMHG

## 2021-03-25 LAB
ANION GAP SERPL CALC-SCNC: 10.6 MMOL/L (ref 8–16)
ANION GAP SERPL CALC-SCNC: 9.9 MMOL/L (ref 8–16)
BASOPHILS NFR BLD AUTO: 0.1 % (ref 0–2)
BUN SERPL-MCNC: 57 MG/DL (ref 7–18)
BUN SERPL-MCNC: 70 MG/DL (ref 7–18)
CALCIUM SERPL-MCNC: 7.4 MG/DL (ref 8.5–10.1)
CALCIUM SERPL-MCNC: 7.7 MG/DL (ref 8.5–10.1)
CHLORIDE SERPL-SCNC: 97 MMOL/L (ref 98–107)
CHLORIDE SERPL-SCNC: 99 MMOL/L (ref 98–107)
CK SERPL-CCNC: 453 UL (ref 21–232)
CO2 SERPL-SCNC: 28.5 MMOL/L (ref 21–32)
CO2 SERPL-SCNC: 31.5 MMOL/L (ref 21–32)
CREAT SERPL-MCNC: 5.4 MG/DL (ref 0.6–1.3)
CREAT SERPL-MCNC: 6.7 MG/DL (ref 0.6–1.3)
EOSINOPHIL NFR BLD: 0 % (ref 0–7)
ERYTHROCYTE [DISTWIDTH] IN BLOOD BY AUTOMATED COUNT: 14 % (ref 11.5–14.5)
GLUCOSE SERPL-MCNC: 148 MG/DL (ref 74–106)
GLUCOSE SERPL-MCNC: 223 MG/DL (ref 74–106)
HCT VFR BLD CALC: 26.2 % (ref 42–54)
HCV AB S/CO SERPL IA: >11 S/CO RAT (ref 0–0.9)
HGB BLD-MCNC: 8.8 G/DL (ref 13.5–17.5)
IMM GRANULOCYTES NFR BLD: 0.7 % (ref 0–5)
LYMPHOCYTES # BLD: 0.46 10X3/UL (ref 1.32–3.57)
LYMPHOCYTES NFR BLD AUTO: 3.8 % (ref 15–50)
MCH RBC QN AUTO: 31.1 PG (ref 26–34)
MCHC RBC AUTO-ENTMCNC: 33.6 G/DL (ref 31–37)
MCV RBC: 92.6 FL (ref 80–100)
MONOCYTES NFR BLD: 8.2 % (ref 2–11)
NEUTROPHILS # BLD: 10.47 10X3/UL (ref 1.78–5.38)
NEUTROPHILS NFR BLD AUTO: 87.2 % (ref 40–80)
OSMOLALITY SERPL CALC.SUM OF ELEC: 292 MOSM/KG (ref 275–300)
OSMOLALITY SERPL CALC.SUM OF ELEC: 292 MOSM/KG (ref 275–300)
PHOSPHATE SERPL-MCNC: 7.5 MG/DL (ref 2.5–4.9)
PLATELET # BLD: 158 10X3/UL (ref 130–400)
PMV BLD AUTO: 8.6 FL (ref 7.4–10.4)
POTASSIUM SERPL-SCNC: 3.4 MMOL/L (ref 3.5–5.1)
POTASSIUM SERPL-SCNC: 4.1 MMOL/L (ref 3.5–5.1)
RBC # BLD AUTO: 2.83 10X6/UL (ref 4.2–6.1)
SODIUM SERPL-SCNC: 132 MMOL/L (ref 136–145)
SODIUM SERPL-SCNC: 137 MMOL/L (ref 136–145)
VANCOMYCIN SERPL-MCNC: 13.7 UG/ML (ref 10–20)
WBC # BLD AUTO: 12 10X3/UL (ref 4.8–10.8)

## 2021-03-25 NOTE — NUR
Nutrition follow-up:
Pt discussed during IDT team rounds
Sleeping during rounds; too sedated per Dr. Ignacio
Diet order: clear liquids.  Pt too sedated to take any PO at this time
Labs reviewed
Dialysis started due to elevated K; K WNL today
Wt: 264#
Recommend starting TF via NGT if medically feasible
Nerpo started @ 25 ml/hr with increase to goal rate of 45 ml/hr
RDN follow-up: 3/29/21

## 2021-03-25 NOTE — NUR
PT AROUSABLE BY PHYSICAL STIMULI. SLURRED SPEECH AND FALLS BACK TO SLEEP. PO
MEDS NOT GIVEN TODAY R/T UNABLE TO TOLERATE R/T SEDATION STATUS. AMBRIZ AWARE
AND ORDERED TO NOT GIVE LIBRIUM AND ATIVAN UNTIL PT IS MORE ALERT AND AWAKE.

## 2021-03-26 VITALS — SYSTOLIC BLOOD PRESSURE: 153 MMHG | DIASTOLIC BLOOD PRESSURE: 89 MMHG

## 2021-03-26 VITALS — DIASTOLIC BLOOD PRESSURE: 86 MMHG | SYSTOLIC BLOOD PRESSURE: 150 MMHG

## 2021-03-26 VITALS — DIASTOLIC BLOOD PRESSURE: 98 MMHG | SYSTOLIC BLOOD PRESSURE: 160 MMHG

## 2021-03-26 VITALS — SYSTOLIC BLOOD PRESSURE: 138 MMHG | DIASTOLIC BLOOD PRESSURE: 81 MMHG

## 2021-03-26 VITALS — SYSTOLIC BLOOD PRESSURE: 129 MMHG | DIASTOLIC BLOOD PRESSURE: 97 MMHG

## 2021-03-26 VITALS — DIASTOLIC BLOOD PRESSURE: 95 MMHG | SYSTOLIC BLOOD PRESSURE: 158 MMHG

## 2021-03-26 VITALS — SYSTOLIC BLOOD PRESSURE: 151 MMHG | DIASTOLIC BLOOD PRESSURE: 89 MMHG

## 2021-03-26 VITALS — DIASTOLIC BLOOD PRESSURE: 93 MMHG | SYSTOLIC BLOOD PRESSURE: 133 MMHG

## 2021-03-26 VITALS — SYSTOLIC BLOOD PRESSURE: 154 MMHG | DIASTOLIC BLOOD PRESSURE: 89 MMHG

## 2021-03-26 VITALS — SYSTOLIC BLOOD PRESSURE: 158 MMHG | DIASTOLIC BLOOD PRESSURE: 91 MMHG

## 2021-03-26 VITALS — SYSTOLIC BLOOD PRESSURE: 117 MMHG | DIASTOLIC BLOOD PRESSURE: 104 MMHG

## 2021-03-26 VITALS — DIASTOLIC BLOOD PRESSURE: 75 MMHG | SYSTOLIC BLOOD PRESSURE: 138 MMHG

## 2021-03-26 VITALS — SYSTOLIC BLOOD PRESSURE: 146 MMHG | DIASTOLIC BLOOD PRESSURE: 79 MMHG

## 2021-03-26 VITALS — SYSTOLIC BLOOD PRESSURE: 157 MMHG | DIASTOLIC BLOOD PRESSURE: 90 MMHG

## 2021-03-26 VITALS — DIASTOLIC BLOOD PRESSURE: 84 MMHG | SYSTOLIC BLOOD PRESSURE: 149 MMHG

## 2021-03-26 VITALS — SYSTOLIC BLOOD PRESSURE: 152 MMHG | DIASTOLIC BLOOD PRESSURE: 83 MMHG

## 2021-03-26 VITALS — SYSTOLIC BLOOD PRESSURE: 139 MMHG | DIASTOLIC BLOOD PRESSURE: 83 MMHG

## 2021-03-26 VITALS — SYSTOLIC BLOOD PRESSURE: 160 MMHG | DIASTOLIC BLOOD PRESSURE: 89 MMHG

## 2021-03-26 VITALS — SYSTOLIC BLOOD PRESSURE: 149 MMHG | DIASTOLIC BLOOD PRESSURE: 87 MMHG

## 2021-03-26 VITALS — DIASTOLIC BLOOD PRESSURE: 87 MMHG | SYSTOLIC BLOOD PRESSURE: 131 MMHG

## 2021-03-26 VITALS — SYSTOLIC BLOOD PRESSURE: 138 MMHG | DIASTOLIC BLOOD PRESSURE: 69 MMHG

## 2021-03-26 VITALS — SYSTOLIC BLOOD PRESSURE: 156 MMHG | DIASTOLIC BLOOD PRESSURE: 93 MMHG

## 2021-03-26 VITALS — DIASTOLIC BLOOD PRESSURE: 91 MMHG | SYSTOLIC BLOOD PRESSURE: 153 MMHG

## 2021-03-26 LAB
ALBUMIN SERPL-MCNC: 1.7 G/DL (ref 3.4–5)
ALP SERPL-CCNC: 86 U/L (ref 30–120)
ALT SERPL-CCNC: 31 U/L (ref 10–68)
ANION GAP SERPL CALC-SCNC: 14.3 MMOL/L (ref 8–16)
BASOPHILS NFR BLD AUTO: 0.1 % (ref 0–2)
BILIRUB DIRECT SERPL-MCNC: 0.13 MG/DL (ref 0–0.3)
BILIRUB INDIRECT SERPL-MCNC: 0.33 MG/DL (ref 0–1)
BILIRUB SERPL-MCNC: 0.46 MG/DL (ref 0.2–1.3)
BUN SERPL-MCNC: 65 MG/DL (ref 7–18)
CALCIUM SERPL-MCNC: 7.5 MG/DL (ref 8.5–10.1)
CHLORIDE SERPL-SCNC: 100 MMOL/L (ref 98–107)
CK SERPL-CCNC: 226 UL (ref 21–232)
CO2 SERPL-SCNC: 27 MMOL/L (ref 21–32)
CREAT SERPL-MCNC: 6.7 MG/DL (ref 0.6–1.3)
EOSINOPHIL NFR BLD: 0.2 % (ref 0–7)
ERYTHROCYTE [DISTWIDTH] IN BLOOD BY AUTOMATED COUNT: 13.6 % (ref 11.5–14.5)
GLOBULIN SER-MCNC: 4.1 G/L
GLUCOSE SERPL-MCNC: 125 MG/DL (ref 74–106)
HCT VFR BLD CALC: 26.2 % (ref 42–54)
HGB BLD-MCNC: 8.6 G/DL (ref 13.5–17.5)
IMM GRANULOCYTES NFR BLD: 0.4 % (ref 0–5)
LYMPHOCYTES # BLD: 1.01 10X3/UL (ref 1.32–3.57)
LYMPHOCYTES NFR BLD AUTO: 9 % (ref 15–50)
MCH RBC QN AUTO: 30.3 PG (ref 26–34)
MCHC RBC AUTO-ENTMCNC: 32.8 G/DL (ref 31–37)
MCV RBC: 92.3 FL (ref 80–100)
MONOCYTES NFR BLD: 6.5 % (ref 2–11)
NEUTROPHILS # BLD: 9.42 10X3/UL (ref 1.78–5.38)
NEUTROPHILS NFR BLD AUTO: 83.8 % (ref 40–80)
OSMOLALITY SERPL CALC.SUM OF ELEC: 295 MOSM/KG (ref 275–300)
PHOSPHATE SERPL-MCNC: 6.1 MG/DL (ref 2.5–4.9)
PLATELET # BLD: 167 10X3/UL (ref 130–400)
PMV BLD AUTO: 8.2 FL (ref 7.4–10.4)
POTASSIUM SERPL-SCNC: 3.3 MMOL/L (ref 3.5–5.1)
PROT SERPL-MCNC: 5.8 G/DL (ref 6.4–8.2)
RBC # BLD AUTO: 2.84 10X6/UL (ref 4.2–6.1)
SODIUM SERPL-SCNC: 138 MMOL/L (ref 136–145)
VANCOMYCIN SERPL-MCNC: 10.5 UG/ML (ref 10–20)
WBC # BLD AUTO: 11.2 10X3/UL (ref 4.8–10.8)

## 2021-03-26 NOTE — NUR
DIALYSIS TREATMENT TODAY, NO FLUID REMOVAL PER TODAY'S ORDER.  TOLERATED 2.5
ORDERED TREATMENT WELL.  ENDING BLOOD PRESSURE 168/88.  REPORT GIVEN TO NARESH JONES RN

## 2021-03-27 VITALS — SYSTOLIC BLOOD PRESSURE: 123 MMHG | DIASTOLIC BLOOD PRESSURE: 69 MMHG

## 2021-03-27 VITALS — DIASTOLIC BLOOD PRESSURE: 63 MMHG | SYSTOLIC BLOOD PRESSURE: 124 MMHG

## 2021-03-27 VITALS — SYSTOLIC BLOOD PRESSURE: 136 MMHG | DIASTOLIC BLOOD PRESSURE: 87 MMHG

## 2021-03-27 VITALS — DIASTOLIC BLOOD PRESSURE: 88 MMHG | SYSTOLIC BLOOD PRESSURE: 154 MMHG

## 2021-03-27 VITALS — DIASTOLIC BLOOD PRESSURE: 81 MMHG | SYSTOLIC BLOOD PRESSURE: 133 MMHG

## 2021-03-27 VITALS — SYSTOLIC BLOOD PRESSURE: 126 MMHG | DIASTOLIC BLOOD PRESSURE: 78 MMHG

## 2021-03-27 VITALS — SYSTOLIC BLOOD PRESSURE: 154 MMHG | DIASTOLIC BLOOD PRESSURE: 93 MMHG

## 2021-03-27 VITALS — SYSTOLIC BLOOD PRESSURE: 110 MMHG | DIASTOLIC BLOOD PRESSURE: 66 MMHG

## 2021-03-27 VITALS — DIASTOLIC BLOOD PRESSURE: 69 MMHG | SYSTOLIC BLOOD PRESSURE: 120 MMHG

## 2021-03-27 VITALS — DIASTOLIC BLOOD PRESSURE: 86 MMHG | SYSTOLIC BLOOD PRESSURE: 151 MMHG

## 2021-03-27 VITALS — SYSTOLIC BLOOD PRESSURE: 122 MMHG | DIASTOLIC BLOOD PRESSURE: 59 MMHG

## 2021-03-27 VITALS — SYSTOLIC BLOOD PRESSURE: 158 MMHG | DIASTOLIC BLOOD PRESSURE: 85 MMHG

## 2021-03-27 VITALS — SYSTOLIC BLOOD PRESSURE: 139 MMHG | DIASTOLIC BLOOD PRESSURE: 89 MMHG

## 2021-03-27 VITALS — DIASTOLIC BLOOD PRESSURE: 97 MMHG | SYSTOLIC BLOOD PRESSURE: 141 MMHG

## 2021-03-27 VITALS — DIASTOLIC BLOOD PRESSURE: 73 MMHG | SYSTOLIC BLOOD PRESSURE: 131 MMHG

## 2021-03-27 VITALS — SYSTOLIC BLOOD PRESSURE: 151 MMHG | DIASTOLIC BLOOD PRESSURE: 91 MMHG

## 2021-03-27 VITALS — DIASTOLIC BLOOD PRESSURE: 97 MMHG | SYSTOLIC BLOOD PRESSURE: 140 MMHG

## 2021-03-27 VITALS — SYSTOLIC BLOOD PRESSURE: 128 MMHG | DIASTOLIC BLOOD PRESSURE: 65 MMHG

## 2021-03-27 VITALS — SYSTOLIC BLOOD PRESSURE: 148 MMHG | DIASTOLIC BLOOD PRESSURE: 93 MMHG

## 2021-03-27 VITALS — SYSTOLIC BLOOD PRESSURE: 130 MMHG | DIASTOLIC BLOOD PRESSURE: 67 MMHG

## 2021-03-27 VITALS — DIASTOLIC BLOOD PRESSURE: 69 MMHG | SYSTOLIC BLOOD PRESSURE: 123 MMHG

## 2021-03-27 VITALS — SYSTOLIC BLOOD PRESSURE: 129 MMHG | DIASTOLIC BLOOD PRESSURE: 68 MMHG

## 2021-03-27 VITALS — DIASTOLIC BLOOD PRESSURE: 91 MMHG | SYSTOLIC BLOOD PRESSURE: 159 MMHG

## 2021-03-27 VITALS — DIASTOLIC BLOOD PRESSURE: 100 MMHG | SYSTOLIC BLOOD PRESSURE: 147 MMHG

## 2021-03-27 VITALS — SYSTOLIC BLOOD PRESSURE: 110 MMHG | DIASTOLIC BLOOD PRESSURE: 68 MMHG

## 2021-03-27 LAB
ANION GAP SERPL CALC-SCNC: 9.1 MMOL/L (ref 8–16)
BASOPHILS NFR BLD AUTO: 0.1 % (ref 0–2)
BUN SERPL-MCNC: 51 MG/DL (ref 7–18)
CALCIUM SERPL-MCNC: 8.3 MG/DL (ref 8.5–10.1)
CHLORIDE SERPL-SCNC: 102 MMOL/L (ref 98–107)
CO2 SERPL-SCNC: 32.2 MMOL/L (ref 21–32)
CREAT SERPL-MCNC: 6 MG/DL (ref 0.6–1.3)
EOSINOPHIL NFR BLD: 0.9 % (ref 0–7)
ERYTHROCYTE [DISTWIDTH] IN BLOOD BY AUTOMATED COUNT: 13.6 % (ref 11.5–14.5)
GLUCOSE SERPL-MCNC: 150 MG/DL (ref 74–106)
HCT VFR BLD CALC: 28.3 % (ref 42–54)
HGB BLD-MCNC: 9.4 G/DL (ref 13.5–17.5)
IMM GRANULOCYTES NFR BLD: 0.8 % (ref 0–5)
LYMPHOCYTES # BLD: 0.93 10X3/UL (ref 1.32–3.57)
LYMPHOCYTES NFR BLD AUTO: 9.7 % (ref 15–50)
MCH RBC QN AUTO: 30.9 PG (ref 26–34)
MCHC RBC AUTO-ENTMCNC: 33.2 G/DL (ref 31–37)
MCV RBC: 93.1 FL (ref 80–100)
MONOCYTES NFR BLD: 7.5 % (ref 2–11)
NEUTROPHILS # BLD: 7.71 10X3/UL (ref 1.78–5.38)
NEUTROPHILS NFR BLD AUTO: 81 % (ref 40–80)
OSMOLALITY SERPL CALC.SUM OF ELEC: 295 MOSM/KG (ref 275–300)
PHOSPHATE SERPL-MCNC: 5.2 MG/DL (ref 2.5–4.9)
PLATELET # BLD: 158 10X3/UL (ref 130–400)
PMV BLD AUTO: 8 FL (ref 7.4–10.4)
POTASSIUM SERPL-SCNC: 3.3 MMOL/L (ref 3.5–5.1)
RBC # BLD AUTO: 3.04 10X6/UL (ref 4.2–6.1)
SODIUM SERPL-SCNC: 140 MMOL/L (ref 136–145)
VANCOMYCIN SERPL-MCNC: 14.1 UG/ML (ref 10–20)
WBC # BLD AUTO: 9.5 10X3/UL (ref 4.8–10.8)

## 2021-03-27 NOTE — NUR
WIFE HERE TODAY, UPDATE GIVEN. ALERT AT TIMES, THEN MAKES STATEMENTS THAT
DON'T MAKE SENSE. GOOD URINE OUTPUT. MONITOR SR. NAPPING AT INTERVALS. NO
DISTRESS OXYGEN AT 2 LITERS PER NC

## 2021-03-27 NOTE — NUR
ON BEDPAN SMALL LIQUID BROWN BM. STATES HIS BELLY IS HURTING HIM. ABD
DISTENDED WITH BOWEL SOUNDS. ALERT BUT HAS CONFUSING CONVERSATIONS. TURNS SELF
FROM SIDE TO SIDE AND CAN PULL SELF UP IN BED. TRIALYSIS CATHETER LEFT
SUBCLAVIAN INFUSING WITH PLASAMALYTE AT 75 ML HOUR. JERONIMO CATH PATENT,
DRINKING LEMON GARCIA DRINKS. TAKE A COUPLE GUESS TO NAME WHERE HE WAS. PLACED
SELF ON BEDPAN. REDNESS NOTED BETWEEN LEGS AND BUTTOCK.

## 2021-03-28 VITALS — SYSTOLIC BLOOD PRESSURE: 152 MMHG | DIASTOLIC BLOOD PRESSURE: 82 MMHG

## 2021-03-28 VITALS — SYSTOLIC BLOOD PRESSURE: 120 MMHG | DIASTOLIC BLOOD PRESSURE: 69 MMHG

## 2021-03-28 VITALS — SYSTOLIC BLOOD PRESSURE: 118 MMHG | DIASTOLIC BLOOD PRESSURE: 64 MMHG

## 2021-03-28 VITALS — DIASTOLIC BLOOD PRESSURE: 70 MMHG | SYSTOLIC BLOOD PRESSURE: 137 MMHG

## 2021-03-28 VITALS — SYSTOLIC BLOOD PRESSURE: 142 MMHG | DIASTOLIC BLOOD PRESSURE: 72 MMHG

## 2021-03-28 VITALS — SYSTOLIC BLOOD PRESSURE: 133 MMHG | DIASTOLIC BLOOD PRESSURE: 85 MMHG

## 2021-03-28 VITALS — DIASTOLIC BLOOD PRESSURE: 84 MMHG | SYSTOLIC BLOOD PRESSURE: 137 MMHG

## 2021-03-28 VITALS — SYSTOLIC BLOOD PRESSURE: 153 MMHG | DIASTOLIC BLOOD PRESSURE: 93 MMHG

## 2021-03-28 VITALS — SYSTOLIC BLOOD PRESSURE: 127 MMHG | DIASTOLIC BLOOD PRESSURE: 40 MMHG

## 2021-03-28 VITALS — DIASTOLIC BLOOD PRESSURE: 79 MMHG | SYSTOLIC BLOOD PRESSURE: 145 MMHG

## 2021-03-28 VITALS — SYSTOLIC BLOOD PRESSURE: 144 MMHG | DIASTOLIC BLOOD PRESSURE: 85 MMHG

## 2021-03-28 VITALS — SYSTOLIC BLOOD PRESSURE: 157 MMHG | DIASTOLIC BLOOD PRESSURE: 85 MMHG

## 2021-03-28 VITALS — SYSTOLIC BLOOD PRESSURE: 15 MMHG | DIASTOLIC BLOOD PRESSURE: 82 MMHG

## 2021-03-28 VITALS — DIASTOLIC BLOOD PRESSURE: 100 MMHG | SYSTOLIC BLOOD PRESSURE: 130 MMHG

## 2021-03-28 VITALS — SYSTOLIC BLOOD PRESSURE: 131 MMHG | DIASTOLIC BLOOD PRESSURE: 83 MMHG

## 2021-03-28 VITALS — SYSTOLIC BLOOD PRESSURE: 145 MMHG | DIASTOLIC BLOOD PRESSURE: 79 MMHG

## 2021-03-28 VITALS — SYSTOLIC BLOOD PRESSURE: 132 MMHG | DIASTOLIC BLOOD PRESSURE: 65 MMHG

## 2021-03-28 VITALS — DIASTOLIC BLOOD PRESSURE: 92 MMHG | SYSTOLIC BLOOD PRESSURE: 151 MMHG

## 2021-03-28 VITALS — SYSTOLIC BLOOD PRESSURE: 146 MMHG | DIASTOLIC BLOOD PRESSURE: 86 MMHG

## 2021-03-28 VITALS — SYSTOLIC BLOOD PRESSURE: 121 MMHG | DIASTOLIC BLOOD PRESSURE: 77 MMHG

## 2021-03-28 VITALS — DIASTOLIC BLOOD PRESSURE: 75 MMHG | SYSTOLIC BLOOD PRESSURE: 139 MMHG

## 2021-03-28 LAB
ANION GAP SERPL CALC-SCNC: 11.6 MMOL/L (ref 8–16)
BASOPHILS NFR BLD AUTO: 0.1 % (ref 0–2)
BUN SERPL-MCNC: 36 MG/DL (ref 7–18)
CALCIUM SERPL-MCNC: 8.3 MG/DL (ref 8.5–10.1)
CHLORIDE SERPL-SCNC: 99 MMOL/L (ref 98–107)
CO2 SERPL-SCNC: 29.8 MMOL/L (ref 21–32)
CREAT SERPL-MCNC: 5.3 MG/DL (ref 0.6–1.3)
EOSINOPHIL NFR BLD: 1.2 % (ref 0–7)
ERYTHROCYTE [DISTWIDTH] IN BLOOD BY AUTOMATED COUNT: 13.2 % (ref 11.5–14.5)
GLUCOSE SERPL-MCNC: 100 MG/DL (ref 74–106)
HCT VFR BLD CALC: 29.5 % (ref 42–54)
HGB BLD-MCNC: 9.7 G/DL (ref 13.5–17.5)
IMM GRANULOCYTES NFR BLD: 2.5 % (ref 0–5)
LYMPHOCYTES # BLD: 1 10X3/UL (ref 1.32–3.57)
LYMPHOCYTES NFR BLD AUTO: 14.5 % (ref 15–50)
MCH RBC QN AUTO: 30.4 PG (ref 26–34)
MCHC RBC AUTO-ENTMCNC: 32.9 G/DL (ref 31–37)
MCV RBC: 92.5 FL (ref 80–100)
MONOCYTES NFR BLD: 8.6 % (ref 2–11)
NEUTROPHILS # BLD: 5.05 10X3/UL (ref 1.78–5.38)
NEUTROPHILS NFR BLD AUTO: 73.1 % (ref 40–80)
OSMOLALITY SERPL CALC.SUM OF ELEC: 281 MOSM/KG (ref 275–300)
PHOSPHATE SERPL-MCNC: 4.3 MG/DL (ref 2.5–4.9)
PLATELET # BLD: 195 10X3/UL (ref 130–400)
PMV BLD AUTO: 8.2 FL (ref 7.4–10.4)
POTASSIUM SERPL-SCNC: 3.4 MMOL/L (ref 3.5–5.1)
RBC # BLD AUTO: 3.19 10X6/UL (ref 4.2–6.1)
SODIUM SERPL-SCNC: 137 MMOL/L (ref 136–145)
VANCOMYCIN SERPL-MCNC: 22.6 UG/ML (ref 10–20)
WBC # BLD AUTO: 6.9 10X3/UL (ref 4.8–10.8)

## 2021-03-28 NOTE — NUR
PT ALERT AND ORIENTED WITH SOME CONFUSION NOTED AT TIMES. MEDS ADMINISTERED
AND WELL TOLERATED. PT HAD 3 LIQUID DIARRHEA AND PREFER SITTING ON BEDSIDE
COMMODE. UOP 1650. VSS WITH METOPROLOL ADMINISTERED. NO SIGNS OF DISTRESS
NOTED. PT IN BED WITH EYES CLOSED. BED IN LOWEST POSITION. PT ORIENTED ON USE
OF CALL BUTTON MULTIPLE TIMES.

## 2021-03-29 VITALS — SYSTOLIC BLOOD PRESSURE: 133 MMHG | DIASTOLIC BLOOD PRESSURE: 81 MMHG

## 2021-03-29 VITALS — DIASTOLIC BLOOD PRESSURE: 68 MMHG | SYSTOLIC BLOOD PRESSURE: 130 MMHG

## 2021-03-29 VITALS — DIASTOLIC BLOOD PRESSURE: 67 MMHG | SYSTOLIC BLOOD PRESSURE: 115 MMHG

## 2021-03-29 VITALS — DIASTOLIC BLOOD PRESSURE: 89 MMHG | SYSTOLIC BLOOD PRESSURE: 147 MMHG

## 2021-03-29 VITALS — DIASTOLIC BLOOD PRESSURE: 78 MMHG | SYSTOLIC BLOOD PRESSURE: 133 MMHG

## 2021-03-29 VITALS — SYSTOLIC BLOOD PRESSURE: 137 MMHG | DIASTOLIC BLOOD PRESSURE: 80 MMHG

## 2021-03-29 VITALS — SYSTOLIC BLOOD PRESSURE: 141 MMHG | DIASTOLIC BLOOD PRESSURE: 75 MMHG

## 2021-03-29 VITALS — DIASTOLIC BLOOD PRESSURE: 73 MMHG | SYSTOLIC BLOOD PRESSURE: 134 MMHG

## 2021-03-29 VITALS — SYSTOLIC BLOOD PRESSURE: 138 MMHG | DIASTOLIC BLOOD PRESSURE: 81 MMHG

## 2021-03-29 VITALS — SYSTOLIC BLOOD PRESSURE: 129 MMHG | DIASTOLIC BLOOD PRESSURE: 80 MMHG

## 2021-03-29 LAB
ALBUMIN SERPL-MCNC: 2.1 G/DL (ref 3.4–5)
ALP SERPL-CCNC: 89 U/L (ref 30–120)
ALT SERPL-CCNC: 26 U/L (ref 10–68)
ANION GAP SERPL CALC-SCNC: 13.1 MMOL/L (ref 8–16)
BASOPHILS NFR BLD AUTO: 0.3 % (ref 0–2)
BILIRUB SERPL-MCNC: 0.3 MG/DL (ref 0.2–1.3)
BUN SERPL-MCNC: 34 MG/DL (ref 7–18)
CALCIUM SERPL-MCNC: 8.3 MG/DL (ref 8.5–10.1)
CHLORIDE SERPL-SCNC: 102 MMOL/L (ref 98–107)
CO2 SERPL-SCNC: 28.4 MMOL/L (ref 21–32)
CREAT SERPL-MCNC: 6 MG/DL (ref 0.6–1.3)
CREAT SERPL-MCNC: 6.2 MG/DL (ref 0.6–1.3)
EOSINOPHIL NFR BLD: 1.6 % (ref 0–7)
ERYTHROCYTE [DISTWIDTH] IN BLOOD BY AUTOMATED COUNT: 13.4 % (ref 11.5–14.5)
GLOBULIN SER-MCNC: 4 G/L
GLUCOSE SERPL-MCNC: 107 MG/DL (ref 74–106)
HCT VFR BLD CALC: 28.6 % (ref 42–54)
HGB BLD-MCNC: 9.2 G/DL (ref 13.5–17.5)
IMM GRANULOCYTES NFR BLD: 3.5 % (ref 0–5)
LYMPHOCYTES # BLD: 1.31 10X3/UL (ref 1.32–3.57)
LYMPHOCYTES NFR BLD AUTO: 19 % (ref 15–50)
MCH RBC QN AUTO: 30.3 PG (ref 26–34)
MCHC RBC AUTO-ENTMCNC: 32.2 G/DL (ref 31–37)
MCV RBC: 94.1 FL (ref 80–100)
MONOCYTES NFR BLD: 6.5 % (ref 2–11)
NEUTROPHILS # BLD: 4.78 10X3/UL (ref 1.78–5.38)
NEUTROPHILS NFR BLD AUTO: 69.1 % (ref 40–80)
OSMOLALITY SERPL CALC.SUM OF ELEC: 286 MOSM/KG (ref 275–300)
PLATELET # BLD: 217 10X3/UL (ref 130–400)
PMV BLD AUTO: 7.9 FL (ref 7.4–10.4)
POTASSIUM SERPL-SCNC: 3.5 MMOL/L (ref 3.5–5.1)
PROT SERPL-MCNC: 6.1 G/DL (ref 6.4–8.2)
RBC # BLD AUTO: 3.04 10X6/UL (ref 4.2–6.1)
SODIUM SERPL-SCNC: 140 MMOL/L (ref 136–145)
VANCOMYCIN TROUGH SERPL-MCNC: 14.2 UG/ML (ref 10–20)
WBC # BLD AUTO: 6.9 10X3/UL (ref 4.8–10.8)

## 2021-03-29 NOTE — NUR
Nutrition follow-up:
Pt discussed in IDT team rounds
Pt up to BS commode; wife assisting
Diet order continues clear liquids
Nurse reports pt choked this am on clear liquids
Labs reviewed
Wt: 194#
Swallow evaluation ordered
RDN follow-up: 3/31/21

## 2021-03-29 NOTE — NUR
PT A0X4, VERY ANXIOUS. CONCERNED ABOUT HIS MEDICATIONS. HAS NOT RECIEVED
LIBRIUM SINCE 8AM. DID NOT GET 1500 DOSE AND IS NOT DUE FOR ANOTHER DOSE UNTIL
MIDNIGHT. . PT TRYING TO PACE ROOM EVEN THOUGH THIS NURSE EDUCATED PT ON
RISK OF FALLING WITH THE MEDICATIONS HE IS TAKING AND THAT HE IS STILL WEAK.
GAVE PT ATIVAN TO HELP CALM HIM, PT NOW SITTING IN BED WITH WIFE AT THIS TIME

## 2021-03-30 VITALS — DIASTOLIC BLOOD PRESSURE: 69 MMHG | SYSTOLIC BLOOD PRESSURE: 136 MMHG

## 2021-03-30 VITALS — DIASTOLIC BLOOD PRESSURE: 84 MMHG | SYSTOLIC BLOOD PRESSURE: 136 MMHG

## 2021-03-30 VITALS — DIASTOLIC BLOOD PRESSURE: 86 MMHG | SYSTOLIC BLOOD PRESSURE: 134 MMHG

## 2021-03-30 VITALS — DIASTOLIC BLOOD PRESSURE: 83 MMHG | SYSTOLIC BLOOD PRESSURE: 141 MMHG

## 2021-03-30 LAB
ALBUMIN SERPL-MCNC: 2 G/DL (ref 3.4–5)
ALP SERPL-CCNC: 88 U/L (ref 30–120)
ALT SERPL-CCNC: 25 U/L (ref 10–68)
ANION GAP SERPL CALC-SCNC: 9.8 MMOL/L (ref 8–16)
BASOPHILS NFR BLD AUTO: 0.2 % (ref 0–2)
BILIRUB SERPL-MCNC: 0.27 MG/DL (ref 0.2–1.3)
BUN SERPL-MCNC: 22 MG/DL (ref 7–18)
CALCIUM SERPL-MCNC: 8.4 MG/DL (ref 8.5–10.1)
CHLORIDE SERPL-SCNC: 102 MMOL/L (ref 98–107)
CO2 SERPL-SCNC: 31.2 MMOL/L (ref 21–32)
CREAT SERPL-MCNC: 4.9 MG/DL (ref 0.6–1.3)
EOSINOPHIL NFR BLD: 1.4 % (ref 0–7)
ERYTHROCYTE [DISTWIDTH] IN BLOOD BY AUTOMATED COUNT: 13.3 % (ref 11.5–14.5)
GLOBULIN SER-MCNC: 4.5 G/L
GLUCOSE SERPL-MCNC: 148 MG/DL (ref 74–106)
HCT VFR BLD CALC: 27.9 % (ref 42–54)
HGB BLD-MCNC: 9 G/DL (ref 13.5–17.5)
IMM GRANULOCYTES NFR BLD: 3.8 % (ref 0–5)
LYMPHOCYTES # BLD: 1.39 10X3/UL (ref 1.32–3.57)
LYMPHOCYTES NFR BLD AUTO: 16.6 % (ref 15–50)
MAGNESIUM SERPL-MCNC: 1.7 MG/DL (ref 1.8–2.4)
MCH RBC QN AUTO: 30.2 PG (ref 26–34)
MCHC RBC AUTO-ENTMCNC: 32.3 G/DL (ref 31–37)
MCV RBC: 93.6 FL (ref 80–100)
MONOCYTES NFR BLD: 6.1 % (ref 2–11)
NEUTROPHILS # BLD: 6 10X3/UL (ref 1.78–5.38)
NEUTROPHILS NFR BLD AUTO: 71.9 % (ref 40–80)
OSMOLALITY SERPL CALC.SUM OF ELEC: 284 MOSM/KG (ref 275–300)
PHOSPHATE SERPL-MCNC: 6 MG/DL (ref 2.5–4.9)
PLATELET # BLD: 227 10X3/UL (ref 130–400)
PMV BLD AUTO: 7.8 FL (ref 7.4–10.4)
POTASSIUM SERPL-SCNC: 3 MMOL/L (ref 3.5–5.1)
PROT SERPL-MCNC: 6.5 G/DL (ref 6.4–8.2)
RBC # BLD AUTO: 2.98 10X6/UL (ref 4.2–6.1)
SODIUM SERPL-SCNC: 140 MMOL/L (ref 136–145)
VANCOMYCIN SERPL-MCNC: 10.3 UG/ML (ref 10–20)
WBC # BLD AUTO: 8.4 10X3/UL (ref 4.8–10.8)

## 2021-03-30 NOTE — NUR
PT WAKES TO VERBAL STIMULI BUT STILL LETHARGIC AFTER ATIVAN. FOLLOWED COMMANDS
TO TURN TO BACK SO THIS NURSE COULD DRAW BLOOD BUT IMMEDIATELY FALLS BACK TO
SLEEP. FSBS 138. WIFE LYING IN BED BESIDE PT. VSS. CL IN REACH

## 2021-03-30 NOTE — NUR
AFTER GIVING PT ATIVAN HE WENT TO SLEEP AND IS STILL ASLEEP AT THIS TIME. PT
WILL WAKE UP TO TOUCH AND VERBAL STIMULI BUT IMMEDIATELY FALLS BACK ASLEEP,
CANNOT WAKE UP LONG ENOUGH TO TAKE LIBRIUM, NOT GIVEN AT THIS TIME. VSS. CL IN
REACH

## 2021-03-31 VITALS — DIASTOLIC BLOOD PRESSURE: 65 MMHG | SYSTOLIC BLOOD PRESSURE: 107 MMHG

## 2021-03-31 VITALS — SYSTOLIC BLOOD PRESSURE: 107 MMHG | DIASTOLIC BLOOD PRESSURE: 71 MMHG

## 2021-03-31 VITALS — DIASTOLIC BLOOD PRESSURE: 78 MMHG | SYSTOLIC BLOOD PRESSURE: 125 MMHG

## 2021-03-31 VITALS — DIASTOLIC BLOOD PRESSURE: 76 MMHG | SYSTOLIC BLOOD PRESSURE: 132 MMHG

## 2021-03-31 VITALS — DIASTOLIC BLOOD PRESSURE: 77 MMHG | SYSTOLIC BLOOD PRESSURE: 110 MMHG

## 2021-03-31 VITALS — SYSTOLIC BLOOD PRESSURE: 132 MMHG | DIASTOLIC BLOOD PRESSURE: 84 MMHG

## 2021-03-31 VITALS — SYSTOLIC BLOOD PRESSURE: 114 MMHG | DIASTOLIC BLOOD PRESSURE: 71 MMHG

## 2021-03-31 VITALS — DIASTOLIC BLOOD PRESSURE: 65 MMHG | SYSTOLIC BLOOD PRESSURE: 105 MMHG

## 2021-03-31 VITALS — SYSTOLIC BLOOD PRESSURE: 120 MMHG | DIASTOLIC BLOOD PRESSURE: 79 MMHG

## 2021-03-31 VITALS — DIASTOLIC BLOOD PRESSURE: 63 MMHG | SYSTOLIC BLOOD PRESSURE: 117 MMHG

## 2021-03-31 VITALS — DIASTOLIC BLOOD PRESSURE: 85 MMHG | SYSTOLIC BLOOD PRESSURE: 121 MMHG

## 2021-03-31 VITALS — DIASTOLIC BLOOD PRESSURE: 70 MMHG | SYSTOLIC BLOOD PRESSURE: 117 MMHG

## 2021-03-31 VITALS — SYSTOLIC BLOOD PRESSURE: 119 MMHG | DIASTOLIC BLOOD PRESSURE: 65 MMHG

## 2021-03-31 VITALS — SYSTOLIC BLOOD PRESSURE: 123 MMHG | DIASTOLIC BLOOD PRESSURE: 79 MMHG

## 2021-03-31 VITALS — SYSTOLIC BLOOD PRESSURE: 115 MMHG | DIASTOLIC BLOOD PRESSURE: 77 MMHG

## 2021-03-31 VITALS — SYSTOLIC BLOOD PRESSURE: 103 MMHG | DIASTOLIC BLOOD PRESSURE: 72 MMHG

## 2021-03-31 VITALS — DIASTOLIC BLOOD PRESSURE: 74 MMHG | SYSTOLIC BLOOD PRESSURE: 114 MMHG

## 2021-03-31 VITALS — DIASTOLIC BLOOD PRESSURE: 79 MMHG | SYSTOLIC BLOOD PRESSURE: 128 MMHG

## 2021-03-31 VITALS — DIASTOLIC BLOOD PRESSURE: 87 MMHG | SYSTOLIC BLOOD PRESSURE: 132 MMHG

## 2021-03-31 LAB
ALBUMIN SERPL-MCNC: 2.2 G/DL (ref 3.4–5)
ALP SERPL-CCNC: 77 U/L (ref 30–120)
ALT SERPL-CCNC: 23 U/L (ref 10–68)
AMPHETAMINES UR QL SCN: NEGATIVE QUAL
ANION GAP SERPL CALC-SCNC: 12.1 MMOL/L (ref 8–16)
BACTERIA #/AREA URNS HPF: (no result) HPF
BARBITURATES UR QL SCN: NEGATIVE QUAL
BASOPHILS NFR BLD AUTO: 0.1 % (ref 0–2)
BENZODIAZ UR QL SCN: POSITIVE QUAL
BILIRUB SERPL-MCNC: 0.25 MG/DL (ref 0.2–1.3)
BILIRUB SERPL-MCNC: NEGATIVE MG/DL
BUN SERPL-MCNC: 18 MG/DL (ref 7–18)
BZE UR QL SCN: NEGATIVE QUAL
CALCIUM SERPL-MCNC: 8.3 MG/DL (ref 8.5–10.1)
CANNABINOIDS UR QL SCN: NEGATIVE QUAL
CHLORIDE SERPL-SCNC: 104 MMOL/L (ref 98–107)
CO2 SERPL-SCNC: 27.9 MMOL/L (ref 21–32)
CREAT SERPL-MCNC: 4.8 MG/DL (ref 0.6–1.3)
EOSINOPHIL NFR BLD: 1.2 % (ref 0–7)
ERYTHROCYTE [DISTWIDTH] IN BLOOD BY AUTOMATED COUNT: 13.3 % (ref 11.5–14.5)
GLOBULIN SER-MCNC: 4.6 G/L
GLUCOSE SERPL-MCNC: 104 MG/DL (ref 74–106)
HCT VFR BLD CALC: 26.7 % (ref 42–54)
HGB BLD-MCNC: 8.7 G/DL (ref 13.5–17.5)
IMM GRANULOCYTES NFR BLD: 2.5 % (ref 0–5)
KETONES UR STRIP-MCNC: NEGATIVE MG/DL
LYMPHOCYTES # BLD: 1.53 10X3/UL (ref 1.32–3.57)
LYMPHOCYTES NFR BLD AUTO: 19.9 % (ref 15–50)
MCH RBC QN AUTO: 30.2 PG (ref 26–34)
MCHC RBC AUTO-ENTMCNC: 32.6 G/DL (ref 31–37)
MCV RBC: 92.7 FL (ref 80–100)
MONOCYTES NFR BLD: 5.3 % (ref 2–11)
NEUTROPHILS # BLD: 5.47 10X3/UL (ref 1.78–5.38)
NEUTROPHILS NFR BLD AUTO: 71 % (ref 40–80)
NITRITE UR-MCNC: NEGATIVE MG/ML
OPIATES UR QL SCN: NEGATIVE QUAL
OSMOLALITY SERPL CALC.SUM OF ELEC: 282 MOSM/KG (ref 275–300)
PCP UR QL SCN: NEGATIVE QUAL
PH UR STRIP: 5 [PH] (ref 5–8)
PHOSPHATE SERPL-MCNC: 5.6 MG/DL (ref 2.5–4.9)
PLATELET # BLD: 226 10X3/UL (ref 130–400)
PMV BLD AUTO: 7.8 FL (ref 7.4–10.4)
POTASSIUM SERPL-SCNC: 3 MMOL/L (ref 3.5–5.1)
PROT SERPL-MCNC: 6.8 G/DL (ref 6.4–8.2)
RBC # BLD AUTO: 2.88 10X6/UL (ref 4.2–6.1)
SODIUM SERPL-SCNC: 141 MMOL/L (ref 136–145)
SQUAMOUS #/AREA URNS HPF: (no result) HPF (ref 0–4)
UROBILINOGEN UR-MCNC: NORMAL MG/DL (ref ?–2)
VANCOMYCIN SERPL-MCNC: 8 UG/ML (ref 10–20)
WBC # BLD AUTO: 7.7 10X3/UL (ref 4.8–10.8)
WBC #/AREA URNS HPF: (no result) HPF (ref 0–1)

## 2021-03-31 NOTE — NUR
SPOKE WITH MARIZA MCKEON. PT TRANSFERRED FROM 2111 TO 2307 DUE TO POSSIBLE SEPSIS.
AMS, TACHYCARDIA, INCREASED TEMP.

## 2021-03-31 NOTE — NUR
Nutrition follow-up:
Pt now in ICU due to possible sepsis
Diet: Renal
PO intake has been ~75% average of most meals on floor
Now with NGT->LIWS and NPO
Dialysis on hold
Labs reviewed
Wt: 260#
RDN will follow-up: 4/2/21

## 2021-03-31 NOTE — CN
PATIENT NAME:JASON HIGGINS                             MEDICAL RECORD: [id number]
: 68                                              LOCATION:[id number]
ADMIT DATE: 21                                       ACCOUNT: [id number]
CONSULTING PHYSICIAN:    REHAN HAWKINS MD             
                                               
REFERRING PHYSICIAN:     CATHI LOWERY MD           
 
 
DATE OF CONSULTATION:  2021
 
IDENTIFYING DATA:  The patient is 50 years old and he is admitted to the
hospital secondary to sepsis and opiate overdose.
 
CHIEF COMPLAINT:  None.
 
HISTORY OF PRESENT ILLNESS:  The patient has a very long and somewhat convoluted
longitudinal history.  He unfortunately is very cooperative and verbal, but does
not tell a coherent, linear explanation of the circumstances.  With some
difficulty, I am able to follow what he is saying and his wife does help.  I do
not get the sense that he is trying to be deliberately deceptive in some way,
but certainly concise coherent explanations are not something he is capable of
providing.  The summation of all of this is that he claims that he used to have
a substance abuse problem, has been through treatment and no longer does have a
substance abuse problem.  He also says that he inadvertently took more
medication than he should have and that he took a benzodiazepine with the
opiates, which is what caused the apparent overdose.  The benzodiazepine was not
his but he still does not believe he has a substance abuse problem.  He also
tells me that he does not overtake his narcotics, which he is on chronically. 
He denies neurovegetative depressive symptoms and psychiatric history.  He
denies any thoughts of harming himself or others.
 
ASSESSMENT:
1.  Adjustment disorder with mixed emotional features
2.  Accidental overdose.
3.  Rule out substance abuse.
 
PLAN:  The patient tells me as mentioned above that he was not trying to hurt
himself and given the circumstances and the acknowledgement by his wife, who is
present, I accept the explanation.  I do not think that there is any concern
that he is in some way directly suicidal at this time.  Furthermore, he does not
wish to have any outpatient mental health treatment, and based on his
presentation, it does not appear that any would be indicated.  I am less
convinced about his explanation that he no longer has a substance abuse problem,
but again he does not fell he does and does not want treatment.  Regarding his
longitudinal medical history, he adamantly denies that he left the hospital AMA
says that he was discharged and has always cooperated with medical treatment. 
This contradicts information in the medical record, but he and his wife strongly
disagree.  From a mental health standpoint, he may be discharged when medically
stable.  There is no recommendation for mental health or substance abuse follows
up based upon the explanations given above.
 
TRANSINT:[id number] Voice Confirmation ID: [id number] DOCUMENT ID: [id number]
 
 
 
CONSULT REPORT                                 [id number]    JASON HIGGINS, REHAN CALIXTO             
 
 
 
Electronically Signed by REHAN HAWKINS on 21 at 1620
 
 
 
 
 
 
 
 
 
 
 
 
 
 
 
 
 
 
 
 
 
 
 
 
 
 
 
 
 
 
 
 
 
 
 
 
 
 
 
 
 
CC:                                                             [id number]
DICTATION DATE: 21 1653     :     21 0020      ADM IN  
                                                                              
[facility]                                          
[address] [place] [address]

## 2021-03-31 NOTE — NUR
pt laying in bed, responds to verbal stimuli but is very drowsey and confused,
pt urinated in the bed and small bm noted, susana care given and linens changed,
pt positioned for comfort, mwill monitor

## 2021-03-31 NOTE — NUR
Pt c/o left sided chest pain intermittently since 5 PM last night. States she is here because she missed work but still wants checked out. Not currently having pain. Denies shortness of breath and cold symptoms. ng tube curled up in the back of pts throat, ng tube removed and new ng tube
inserted in left nare, placement verified with air bolus, pt tolerated well,
condem cath placed on pt at this time also

## 2021-03-31 NOTE — NUR
pt pulled out ng tube at this time, Dr. Chavez aware and said to leave ng tube
out at this time, will monitor

## 2021-03-31 NOTE — NUR
1930- 03/31/21 -DURING ROUNDING PT ABD. DISTENTION AND FIRMNESS OBSERVED. PT
COMPLAINED OF PAIN IN ABD. PT SOB. MARIZA MCKEON NOTIFIED KUB ORDERED. RESULTS
CALLED BACK TO [facility], PT MADE NPO AND SURGERY CONSULT ORDERED. SEE RESULTS.
OVER THE NEXT FEW HOURS PT BECAME INCREASINGLY BILIGERENT ACTING AND WORDS
WERE ICOMPREHENSIBLE. [facility] CALLED. UPON FURTHER INVESTIGATION PT HAD HOME
MEDS SERAQUIL AND GABBAPENTION ON PERSON. WIFE THINKS HE MAY HAVE TAKEN AN
UNKOWN AMOUNT BUT WASN'T 100%. KARYNA BRO RN AND MYSELFINSTRUCTED WIFE TO
TAKE HOME AND SHE DID. SHE STATED THIS IS HOW HE WAS ACTING WHEN SHE CALLED
AMBULANCE. WAS ABLE TO GET PT INTO BED AND CALMED.
0545-THIS DAY. PT TEMP ELEVATED 102.1 , BP-110/67. [facility] ICU, HOUSE SUP,
AND MARIZA MCKEON NOTIFIED AT THIS TIME. PT TRANSFERED TO ICU AT THIS TIME.
RENAL PAGED FOR NOTIFIACTION AT THIS TIME. THIS NURSE VERBALLY INFORMED DR. GREENE WHO VERBALLY ACKNOWLEDGED AND ASKED THAT THIS BE CALLED TO APN ON CALL.
PAGED ANOTHER TIME END OF SHIFT. WAITING FOR CALLBACK. THIS NURSE DIDN'T
RECIEVE. PASSED TO ICU.

## 2021-04-01 VITALS — SYSTOLIC BLOOD PRESSURE: 137 MMHG | DIASTOLIC BLOOD PRESSURE: 70 MMHG

## 2021-04-01 VITALS — DIASTOLIC BLOOD PRESSURE: 79 MMHG | SYSTOLIC BLOOD PRESSURE: 134 MMHG

## 2021-04-01 VITALS — DIASTOLIC BLOOD PRESSURE: 67 MMHG | SYSTOLIC BLOOD PRESSURE: 118 MMHG

## 2021-04-01 VITALS — DIASTOLIC BLOOD PRESSURE: 86 MMHG | SYSTOLIC BLOOD PRESSURE: 133 MMHG

## 2021-04-01 VITALS — SYSTOLIC BLOOD PRESSURE: 131 MMHG | DIASTOLIC BLOOD PRESSURE: 77 MMHG

## 2021-04-01 VITALS — SYSTOLIC BLOOD PRESSURE: 121 MMHG | DIASTOLIC BLOOD PRESSURE: 78 MMHG

## 2021-04-01 VITALS — SYSTOLIC BLOOD PRESSURE: 132 MMHG | DIASTOLIC BLOOD PRESSURE: 86 MMHG

## 2021-04-01 VITALS — DIASTOLIC BLOOD PRESSURE: 62 MMHG | SYSTOLIC BLOOD PRESSURE: 111 MMHG

## 2021-04-01 VITALS — SYSTOLIC BLOOD PRESSURE: 117 MMHG | DIASTOLIC BLOOD PRESSURE: 62 MMHG

## 2021-04-01 VITALS — SYSTOLIC BLOOD PRESSURE: 143 MMHG | DIASTOLIC BLOOD PRESSURE: 85 MMHG

## 2021-04-01 VITALS — DIASTOLIC BLOOD PRESSURE: 89 MMHG | SYSTOLIC BLOOD PRESSURE: 125 MMHG

## 2021-04-01 VITALS — SYSTOLIC BLOOD PRESSURE: 120 MMHG | DIASTOLIC BLOOD PRESSURE: 75 MMHG

## 2021-04-01 VITALS — SYSTOLIC BLOOD PRESSURE: 130 MMHG | DIASTOLIC BLOOD PRESSURE: 70 MMHG

## 2021-04-01 VITALS — SYSTOLIC BLOOD PRESSURE: 129 MMHG | DIASTOLIC BLOOD PRESSURE: 54 MMHG

## 2021-04-01 VITALS — DIASTOLIC BLOOD PRESSURE: 62 MMHG | SYSTOLIC BLOOD PRESSURE: 107 MMHG

## 2021-04-01 LAB
ALBUMIN SERPL-MCNC: 2.1 G/DL (ref 3.4–5)
ALP SERPL-CCNC: 79 U/L (ref 30–120)
ALT SERPL-CCNC: 22 U/L (ref 10–68)
ANION GAP SERPL CALC-SCNC: 12.5 MMOL/L (ref 8–16)
BASOPHILS NFR BLD AUTO: 0.1 % (ref 0–2)
BILIRUB SERPL-MCNC: 0.3 MG/DL (ref 0.2–1.3)
BUN SERPL-MCNC: 22 MG/DL (ref 7–18)
CALCIUM SERPL-MCNC: 8.6 MG/DL (ref 8.5–10.1)
CHLORIDE SERPL-SCNC: 107 MMOL/L (ref 98–107)
CO2 SERPL-SCNC: 29.7 MMOL/L (ref 21–32)
CREAT SERPL-MCNC: 4.4 MG/DL (ref 0.6–1.3)
EOSINOPHIL NFR BLD: 1.1 % (ref 0–7)
ERYTHROCYTE [DISTWIDTH] IN BLOOD BY AUTOMATED COUNT: 13.5 % (ref 11.5–14.5)
GLOBULIN SER-MCNC: 4.9 G/L
GLUCOSE SERPL-MCNC: 104 MG/DL (ref 74–106)
HCT VFR BLD CALC: 29.2 % (ref 42–54)
HGB BLD-MCNC: 9.3 G/DL (ref 13.5–17.5)
IMM GRANULOCYTES NFR BLD: 1.6 % (ref 0–5)
LYMPHOCYTES # BLD: 1 10X3/UL (ref 1.32–3.57)
LYMPHOCYTES NFR BLD AUTO: 11.3 % (ref 15–50)
MCH RBC QN AUTO: 30.6 PG (ref 26–34)
MCHC RBC AUTO-ENTMCNC: 31.8 G/DL (ref 31–37)
MCV RBC: 96.1 FL (ref 80–100)
MONOCYTES NFR BLD: 5.5 % (ref 2–11)
NEUTROPHILS # BLD: 7.14 10X3/UL (ref 1.78–5.38)
NEUTROPHILS NFR BLD AUTO: 80.4 % (ref 40–80)
OSMOLALITY SERPL CALC.SUM OF ELEC: 291 MOSM/KG (ref 275–300)
PHOSPHATE SERPL-MCNC: 6.2 MG/DL (ref 2.5–4.9)
PLATELET # BLD: 266 10X3/UL (ref 130–400)
PMV BLD AUTO: 8.2 FL (ref 7.4–10.4)
POTASSIUM SERPL-SCNC: 4.2 MMOL/L (ref 3.5–5.1)
PROT SERPL-MCNC: 7 G/DL (ref 6.4–8.2)
RBC # BLD AUTO: 3.04 10X6/UL (ref 4.2–6.1)
SODIUM SERPL-SCNC: 145 MMOL/L (ref 136–145)
VANCOMYCIN SERPL-MCNC: 17.5 UG/ML (ref 10–20)
WBC # BLD AUTO: 8.9 10X3/UL (ref 4.8–10.8)

## 2021-04-01 NOTE — NUR
Patient slept most of the night, but woke up a few times yelling, demanding to
see his wife, and insisting that we have have something that belongs to him.
Patient goes in out of confusion. Patient is currently resting well.

## 2021-04-01 NOTE — NUR
PATIENT RECIEVED TO UNIT FROM ICU. LUNGS CLEAR ON AUSCULTATION. BOWEL SOUNDS
HYPOACTIVE. REPORTS BM POSSIBLY YESTERDAY BUT WAS "OUT OF IT." NO PAIN
REPORTED AT THIS TIME. NO EDEMA OR WOUNDS. GOT UP FROM WHEELCHAIR TO BED
INDEPENDENTLY. PARTICIPATES IN CONVERSATION WITH NURSE APPROPRIATELY.

## 2021-04-02 VITALS — DIASTOLIC BLOOD PRESSURE: 61 MMHG | SYSTOLIC BLOOD PRESSURE: 100 MMHG

## 2021-04-02 VITALS — SYSTOLIC BLOOD PRESSURE: 142 MMHG | DIASTOLIC BLOOD PRESSURE: 80 MMHG

## 2021-04-02 LAB
ALBUMIN SERPL-MCNC: 2.1 G/DL (ref 3.4–5)
ALP SERPL-CCNC: 79 U/L (ref 30–120)
ALT SERPL-CCNC: 21 U/L (ref 10–68)
ANION GAP SERPL CALC-SCNC: 13.6 MMOL/L (ref 8–16)
BASOPHILS NFR BLD AUTO: 0.2 % (ref 0–2)
BILIRUB SERPL-MCNC: 0.18 MG/DL (ref 0.2–1.3)
BUN SERPL-MCNC: 18 MG/DL (ref 7–18)
CALCIUM SERPL-MCNC: 8.1 MG/DL (ref 8.5–10.1)
CHLORIDE SERPL-SCNC: 103 MMOL/L (ref 98–107)
CO2 SERPL-SCNC: 27.6 MMOL/L (ref 21–32)
CREAT SERPL-MCNC: 3.6 MG/DL (ref 0.6–1.3)
EOSINOPHIL NFR BLD: 1.4 % (ref 0–7)
ERYTHROCYTE [DISTWIDTH] IN BLOOD BY AUTOMATED COUNT: 13.1 % (ref 11.5–14.5)
GLOBULIN SER-MCNC: 5 G/L
GLUCOSE SERPL-MCNC: 130 MG/DL (ref 74–106)
HCT VFR BLD CALC: 28.1 % (ref 42–54)
HGB BLD-MCNC: 8.9 G/DL (ref 13.5–17.5)
IMM GRANULOCYTES NFR BLD: 1.3 % (ref 0–5)
LYMPHOCYTES # BLD: 1.53 10X3/UL (ref 1.32–3.57)
LYMPHOCYTES NFR BLD AUTO: 16.4 % (ref 15–50)
MCH RBC QN AUTO: 29.9 PG (ref 26–34)
MCHC RBC AUTO-ENTMCNC: 31.7 G/DL (ref 31–37)
MCV RBC: 94.3 FL (ref 80–100)
MONOCYTES NFR BLD: 5.9 % (ref 2–11)
NEUTROPHILS # BLD: 6.98 10X3/UL (ref 1.78–5.38)
NEUTROPHILS NFR BLD AUTO: 74.8 % (ref 40–80)
OSMOLALITY SERPL CALC.SUM OF ELEC: 284 MOSM/KG (ref 275–300)
PHOSPHATE SERPL-MCNC: 3.6 MG/DL (ref 2.5–4.9)
PLATELET # BLD: 259 10X3/UL (ref 130–400)
PMV BLD AUTO: 8.2 FL (ref 7.4–10.4)
POTASSIUM SERPL-SCNC: 3.2 MMOL/L (ref 3.5–5.1)
PROT SERPL-MCNC: 7.1 G/DL (ref 6.4–8.2)
RBC # BLD AUTO: 2.98 10X6/UL (ref 4.2–6.1)
SODIUM SERPL-SCNC: 141 MMOL/L (ref 136–145)
VANCOMYCIN SERPL-MCNC: 18.2 UG/ML (ref 10–20)
WBC # BLD AUTO: 9.3 10X3/UL (ref 4.8–10.8)

## 2021-04-02 NOTE — NUR
PT LYING IN BED. RESP EVEN AND UNLABORED. AAOX4. NO DISTRESS NOTED. REQUEST
TYLENOL FOR BACK PAIN. PRN TYLENOL ADMINISTERED. DENIES ANY OTHER NEEDS AT
THIS TIME. CLIR. BED IN LOWEST POSITION. SIDE RAILS X2

## 2021-04-02 NOTE — NUR
NUTRITION FOLLOW UP:
 
INTERVIEW: Met with patient this afternoon. He stated his appetite is good. He
denied any new issues with nausea, vomiting, diarrhea, constipation, or
chewing/swallowing his food.
 
DIET: Renal Diet
PO INTAKE: 81% avg for last 6 meals
BM: x 2 on 4/1
 
SIG MEDS: Probiotic, Humalog, KCl, MVI, Thiamine, Protonix
SIG LABS: K-3.3(L), Cr-3.6(H), Ca-8.1(L), Mg-1.7(L)
POC Glucose- 162, 148, 163, 129
 
RECOMMENDATIONS:
Continue Renal diet as tolerated
Offer nutritional supplements if PO intake becomes <50% avg for meals
 
RD to follow up on 4/6
 
RD TO FOLLOW UP ON 4/6

## 2021-04-02 NOTE — NUR
PT LEFT HOSPITAL BEFORE PAPERWORK WAS PRINTED AND SIGNED. ELLI DAWKINS AWARE.
TRIALYSIS PULLED APPROXIATELY 1 HOUR
PRIOR TO PT LEAVING. INSTRUCTIONS GIVEN ABOUT WOUND CARE AND
TO USE ARM MINIMALLY FOR SEVERAL HOURS.

## 2021-04-02 NOTE — NUR
WENT TO ROOM TRYING TO START IV FOR ANTIBIOTICS.  INSTRUCTED TRIALYSIS NURSE
PORT NOT PATENT AT PRESENT.  STATES DOES NOT WANT IV OR ANTIBIOTICS THAT ARE
NOT DOING ANY GOOD.  WANTS TRIALYSIS OUT AND JUST TO GO HOME.  WILL NOTIFY
APRN.

## 2021-04-02 NOTE — NUR
PT DENIED IV. CHARGE NURSE, KRISHNA BAUTISTA, EXPLAINED IMPORTANCE OF IV ACCESS FOR IV
ANTIBIOTICS. PT VERBALIZED UNDERSTANDING. WILL NOTIFY ELLI DAWKINS.
